# Patient Record
Sex: MALE | Race: WHITE | NOT HISPANIC OR LATINO | Employment: OTHER | URBAN - METROPOLITAN AREA
[De-identification: names, ages, dates, MRNs, and addresses within clinical notes are randomized per-mention and may not be internally consistent; named-entity substitution may affect disease eponyms.]

---

## 2020-03-26 ENCOUNTER — OFFICE VISIT (OUTPATIENT)
Dept: UROLOGY | Facility: CLINIC | Age: 75
End: 2020-03-26
Payer: MEDICARE

## 2020-03-26 VITALS
RESPIRATION RATE: 18 BRPM | DIASTOLIC BLOOD PRESSURE: 72 MMHG | WEIGHT: 218 LBS | TEMPERATURE: 98 F | BODY MASS INDEX: 27.98 KG/M2 | SYSTOLIC BLOOD PRESSURE: 125 MMHG | HEART RATE: 64 BPM | HEIGHT: 74 IN

## 2020-03-26 DIAGNOSIS — R33.9 URINARY RETENTION: ICD-10-CM

## 2020-03-26 DIAGNOSIS — N13.8 BPH WITH OBSTRUCTION/LOWER URINARY TRACT SYMPTOMS: Primary | ICD-10-CM

## 2020-03-26 DIAGNOSIS — R30.0 DYSURIA: ICD-10-CM

## 2020-03-26 DIAGNOSIS — N40.1 BPH WITH OBSTRUCTION/LOWER URINARY TRACT SYMPTOMS: Primary | ICD-10-CM

## 2020-03-26 PROCEDURE — 99204 OFFICE O/P NEW MOD 45 MIN: CPT | Mod: S$GLB,,, | Performed by: UROLOGY

## 2020-03-26 PROCEDURE — 99204 PR OFFICE/OUTPT VISIT, NEW, LEVL IV, 45-59 MIN: ICD-10-PCS | Mod: S$GLB,,, | Performed by: UROLOGY

## 2020-03-26 RX ORDER — MEMANTINE HYDROCHLORIDE 5 MG/1
TABLET ORAL
COMMUNITY
Start: 2020-02-12 | End: 2023-10-31

## 2020-03-26 RX ORDER — LEVOTHYROXINE SODIUM 150 MCG
TABLET ORAL
COMMUNITY
Start: 2020-02-19 | End: 2023-10-31

## 2020-03-26 RX ORDER — LOSARTAN POTASSIUM 100 MG/1
TABLET ORAL
COMMUNITY
Start: 2020-03-15

## 2020-03-26 RX ORDER — TAMSULOSIN HYDROCHLORIDE 0.4 MG/1
CAPSULE ORAL
COMMUNITY
Start: 2020-03-16 | End: 2020-10-27

## 2020-03-26 RX ORDER — ATORVASTATIN CALCIUM 40 MG/1
TABLET, FILM COATED ORAL
COMMUNITY
Start: 2020-01-12 | End: 2023-10-31

## 2020-03-26 RX ORDER — SITAGLIPTIN AND METFORMIN HYDROCHLORIDE 1000; 100 MG/1; MG/1
TABLET, FILM COATED, EXTENDED RELEASE ORAL
COMMUNITY
Start: 2020-03-16 | End: 2023-10-31

## 2020-03-26 RX ORDER — FINASTERIDE 5 MG/1
5 TABLET, FILM COATED ORAL DAILY
Qty: 30 TABLET | Refills: 11 | Status: SHIPPED | OUTPATIENT
Start: 2020-03-26 | End: 2020-10-27

## 2020-03-26 RX ORDER — AMLODIPINE BESYLATE 10 MG/1
TABLET ORAL
COMMUNITY
Start: 2020-01-12

## 2020-03-26 RX ORDER — NAPROXEN SODIUM 220 MG/1
81 TABLET, FILM COATED ORAL
COMMUNITY

## 2020-03-26 RX ORDER — HYDROCHLOROTHIAZIDE 12.5 MG/1
TABLET ORAL
COMMUNITY
Start: 2020-03-16 | End: 2022-11-15

## 2020-03-26 RX ORDER — METOPROLOL SUCCINATE 200 MG/1
TABLET, EXTENDED RELEASE ORAL
COMMUNITY
Start: 2020-02-08

## 2020-03-26 NOTE — PROGRESS NOTES
Subjective:       Patient ID: Kevan Rodriguez is a 74 y.o. male.    Chief Complaint: Urinary Retention (shoulder sx done last thursday, that friday cath placed. once removed cath placed again yesterday due to retention)      HPI:  74-year-old male who had postop urinary retention after shoulder surgery he has been on Flomax for several years he failed 1 voiding trial after surgery and had a 2nd catheter placed he is here for management    Past Medical History:   Past Medical History:   Diagnosis Date    Dementia     Diabetes mellitus     Hypertension        Past Surgical Historical:   Past Surgical History:   Procedure Laterality Date    SHOULDER SURGERY Left         Medications:   Medication List with Changes/Refills   Current Medications    AMLODIPINE (NORVASC) 10 MG TABLET        ASPIRIN 81 MG CHEW    81 mg.    ATORVASTATIN (LIPITOR) 40 MG TABLET        HYDROCHLOROTHIAZIDE (HYDRODIURIL) 12.5 MG TAB        JANUMET -1,000 MG TM24        LOSARTAN (COZAAR) 100 MG TABLET        MEMANTINE (NAMENDA) 5 MG TAB        METOPROLOL SUCCINATE (TOPROL-XL) 200 MG 24 HR TABLET        SYNTHROID 150 MCG TABLET        TAMSULOSIN (FLOMAX) 0.4 MG CAP            Past Social History:   Social History     Socioeconomic History    Marital status:      Spouse name: Not on file    Number of children: Not on file    Years of education: Not on file    Highest education level: Not on file   Occupational History    Not on file   Social Needs    Financial resource strain: Not on file    Food insecurity:     Worry: Not on file     Inability: Not on file    Transportation needs:     Medical: Not on file     Non-medical: Not on file   Tobacco Use    Smoking status: Never Smoker    Smokeless tobacco: Never Used   Substance and Sexual Activity    Alcohol use: Never     Frequency: Never    Drug use: Never    Sexual activity: Not on file   Lifestyle    Physical activity:     Days per week: Not on file     Minutes per  session: Not on file    Stress: Not on file   Relationships    Social connections:     Talks on phone: Not on file     Gets together: Not on file     Attends Hindu service: Not on file     Active member of club or organization: Not on file     Attends meetings of clubs or organizations: Not on file     Relationship status: Not on file   Other Topics Concern    Not on file   Social History Narrative    Not on file       Allergies: Review of patient's allergies indicates:  No Known Allergies     Family History: History reviewed. No pertinent family history.     Review of Systems:  Review of Systems   Constitutional: Negative for activity change and appetite change.   HENT: Negative for congestion and dental problem.    Eyes: Negative for visual disturbance.   Respiratory: Negative for chest tightness and shortness of breath.    Cardiovascular: Negative for chest pain.   Gastrointestinal: Negative for abdominal distention and abdominal pain.   Endocrine: Negative for polyuria.   Genitourinary: Negative for difficulty urinating, discharge, dysuria, flank pain, frequency, hematuria, penile pain, penile swelling, scrotal swelling, testicular pain and urgency.   Musculoskeletal: Negative for back pain and neck pain.   Skin: Negative for color change.   Allergic/Immunologic: Positive for immunocompromised state.   Neurological: Negative for dizziness.   Hematological: Negative for adenopathy.   Psychiatric/Behavioral: Negative for agitation, behavioral problems and confusion.       Physical Exam:  Physical Exam   Constitutional: He is oriented to person, place, and time. He appears well-developed and well-nourished. No distress.   HENT:   Head: Normocephalic and atraumatic.   Nose: Nose normal.   Mouth/Throat: Oropharynx is clear and moist.   Eyes: Conjunctivae and EOM are normal. Pupils are equal, round, and reactive to light. No scleral icterus.   Neck: Neck supple. No tracheal deviation present. No thyromegaly  present.   Cardiovascular: Normal rate, regular rhythm and normal heart sounds.    Pulmonary/Chest: Effort normal and breath sounds normal. No respiratory distress. He has no wheezes. He has no rales.   Abdominal: Soft. Bowel sounds are normal. He exhibits no distension. There is no tenderness. There is no rebound and no guarding.   Genitourinary: Prostate normal and penis normal. No penile tenderness.   Musculoskeletal: Normal range of motion. He exhibits no edema or deformity.   Lymphadenopathy:     He has no cervical adenopathy.   Neurological: He is alert and oriented to person, place, and time. No cranial nerve deficit.   Skin: Skin is warm and dry. No rash noted. No erythema.     Psychiatric: He has a normal mood and affect. His behavior is normal.       Assessment/Plan:       Problem List Items Addressed This Visit     None      Visit Diagnoses     BPH with obstruction/lower urinary tract symptoms    -  Primary    Urinary retention        Dysuria                 .  Postop urinary retention:  The patient is to take double dose of Flomax we also prescribed finasteride he is to come back on Monday for a voiding trial we discussed transurethral resection of prostate and will proceed as necessary

## 2020-03-30 ENCOUNTER — CLINICAL SUPPORT (OUTPATIENT)
Dept: UROLOGY | Facility: CLINIC | Age: 75
End: 2020-03-30
Payer: MEDICARE

## 2020-03-30 ENCOUNTER — TELEPHONE (OUTPATIENT)
Dept: UROLOGY | Facility: CLINIC | Age: 75
End: 2020-03-30

## 2020-03-30 DIAGNOSIS — R33.9 URINARY RETENTION: Primary | ICD-10-CM

## 2020-03-30 PROCEDURE — 51700 IRRIGATION OF BLADDER: CPT | Mod: S$GLB,,, | Performed by: UROLOGY

## 2020-03-30 PROCEDURE — 51700 PR IRRIGATION, BLADDER: ICD-10-PCS | Mod: S$GLB,,, | Performed by: UROLOGY

## 2020-03-30 NOTE — TELEPHONE ENCOUNTER
Spoke to pt's wife who will continue to monitor when pt voids and will call us in the morning. Advised her to take him to the ER after hours if pt is unable to void. She verbalized understanding. ----- Message from Chaz Duran sent at 3/30/2020  3:21 PM CDT -----  Contact: Pt's wife  Kevan's wife called to say that the patient has only voided 300cc's and she has given him 2000cc's of fluid today [phone].

## 2020-03-30 NOTE — PROGRESS NOTES
Pt here for voiding trail status hospital f/u for shoulder surgery. Inserted roughly 200cc. Catheter balloon deflated, roughly 15cc. Removed catheter. Pt tolerated well. Pt unable to void during office visit, went home with urinal and with instructions to drink plenty fluids and call back if unable to void before 3pm. Pt verbalized understanding. MG

## 2020-04-06 ENCOUNTER — TELEPHONE (OUTPATIENT)
Dept: UROLOGY | Facility: CLINIC | Age: 75
End: 2020-04-06

## 2020-04-06 NOTE — TELEPHONE ENCOUNTER
----- Message from Bindu Brandon sent at 4/6/2020  9:45 AM CDT -----  Contact: patient's wife  Patient's wife is needing to schedule patient an appointment . Please call back at [phone]

## 2020-04-06 NOTE — TELEPHONE ENCOUNTER
pts wife stated last Monday after cath was removed here in office they had to go to ER again to have it replaced due to retention. She stated they would like to keep this catheter until they know what is wrong bc as we remove they keep having to go back. Informed pt we would scheduled a visit to talk with Dr. Pimentel regarding a plan, pt requested this. Scheduled for Thursday. [facility]

## 2020-04-09 ENCOUNTER — OFFICE VISIT (OUTPATIENT)
Dept: UROLOGY | Facility: CLINIC | Age: 75
End: 2020-04-09
Payer: MEDICARE

## 2020-04-09 VITALS
DIASTOLIC BLOOD PRESSURE: 56 MMHG | WEIGHT: 218 LBS | BODY MASS INDEX: 27.98 KG/M2 | SYSTOLIC BLOOD PRESSURE: 100 MMHG | RESPIRATION RATE: 18 BRPM | HEIGHT: 74 IN | HEART RATE: 62 BPM

## 2020-04-09 DIAGNOSIS — N13.8 BPH WITH OBSTRUCTION/LOWER URINARY TRACT SYMPTOMS: ICD-10-CM

## 2020-04-09 DIAGNOSIS — R35.1 NOCTURIA: ICD-10-CM

## 2020-04-09 DIAGNOSIS — N40.1 BPH WITH OBSTRUCTION/LOWER URINARY TRACT SYMPTOMS: ICD-10-CM

## 2020-04-09 DIAGNOSIS — R33.9 URINARY RETENTION: Primary | ICD-10-CM

## 2020-04-09 PROCEDURE — 99213 PR OFFICE/OUTPT VISIT, EST, LEVL III, 20-29 MIN: ICD-10-PCS | Mod: S$GLB,,, | Performed by: UROLOGY

## 2020-04-09 PROCEDURE — 99213 OFFICE O/P EST LOW 20 MIN: CPT | Mod: S$GLB,,, | Performed by: UROLOGY

## 2020-04-09 RX ORDER — NITROFURANTOIN 25; 75 MG/1; MG/1
CAPSULE ORAL
COMMUNITY
Start: 2020-03-31 | End: 2022-11-15

## 2020-04-09 NOTE — PROGRESS NOTES
Subjective:       Patient ID: Kevan Rodriguez is a 74 y.o. male.    Chief Complaint: Urinary Retention (after voiding trial/removal of catheter pt is still having retention ending up in ER with another catheter placed. here today to discuss options on what to do/plan)      HPI:  74-year-old male recent left-sided shoulder surgery went into postop retention subsequently he has failed now 2 voiding trials.  He is on Flomax and finasteride for 2 weeks    Past Medical History:   Past Medical History:   Diagnosis Date    Dementia     Diabetes mellitus     Hypertension        Past Surgical Historical:   Past Surgical History:   Procedure Laterality Date    SHOULDER SURGERY Left         Medications:   Medication List with Changes/Refills   Current Medications    AMLODIPINE (NORVASC) 10 MG TABLET        ASPIRIN 81 MG CHEW    81 mg.    ATORVASTATIN (LIPITOR) 40 MG TABLET        FINASTERIDE (PROSCAR) 5 MG TABLET    Take 1 tablet (5 mg total) by mouth once daily.    HYDROCHLOROTHIAZIDE (HYDRODIURIL) 12.5 MG TAB        JANUMET -1,000 MG TM24        LOSARTAN (COZAAR) 100 MG TABLET        MEMANTINE (NAMENDA) 5 MG TAB        METOPROLOL SUCCINATE (TOPROL-XL) 200 MG 24 HR TABLET        NITROFURANTOIN, MACROCRYSTAL-MONOHYDRATE, (MACROBID) 100 MG CAPSULE        SYNTHROID 150 MCG TABLET        TAMSULOSIN (FLOMAX) 0.4 MG CAP            Past Social History:   Social History     Socioeconomic History    Marital status:      Spouse name: Not on file    Number of children: Not on file    Years of education: Not on file    Highest education level: Not on file   Occupational History    Not on file   Social Needs    Financial resource strain: Not on file    Food insecurity:     Worry: Not on file     Inability: Not on file    Transportation needs:     Medical: Not on file     Non-medical: Not on file   Tobacco Use    Smoking status: Never Smoker    Smokeless tobacco: Never Used   Substance and Sexual Activity     Alcohol use: Never     Frequency: Never    Drug use: Never    Sexual activity: Not on file   Lifestyle    Physical activity:     Days per week: Not on file     Minutes per session: Not on file    Stress: Not on file   Relationships    Social connections:     Talks on phone: Not on file     Gets together: Not on file     Attends Gnosticist service: Not on file     Active member of club or organization: Not on file     Attends meetings of clubs or organizations: Not on file     Relationship status: Not on file   Other Topics Concern    Not on file   Social History Narrative    Not on file       Allergies: Review of patient's allergies indicates:  No Known Allergies     Family History: History reviewed. No pertinent family history.     Review of Systems:  Review of Systems   Constitutional: Negative for activity change and appetite change.   HENT: Negative for congestion and dental problem.    Respiratory: Negative for chest tightness and shortness of breath.    Cardiovascular: Negative for chest pain.   Gastrointestinal: Negative for abdominal distention and abdominal pain.   Endocrine: Negative for polyuria.   Genitourinary: Negative for difficulty urinating, discharge, dysuria, flank pain, frequency, hematuria, penile pain, penile swelling, scrotal swelling, testicular pain and urgency.   Musculoskeletal: Negative for back pain and neck pain.   Allergic/Immunologic: Positive for food allergies.   Neurological: Negative for dizziness.   Hematological: Negative for adenopathy.   Psychiatric/Behavioral: Negative for agitation, behavioral problems and confusion.       Physical Exam:  Physical Exam   Constitutional: He appears well-developed and well-nourished.   HENT:   Head: Normocephalic and atraumatic.   Eyes: Pupils are equal, round, and reactive to light.   Cardiovascular: Normal rate, regular rhythm and normal heart sounds.    Pulmonary/Chest: Effort normal and breath sounds normal. No respiratory distress.  He has no wheezes. He has no rales.   Abdominal: Soft. Bowel sounds are normal. He exhibits no distension. There is no tenderness. There is no rebound and no guarding.   Genitourinary: Prostate normal and penis normal. No penile tenderness.   Musculoskeletal: Normal range of motion.       Assessment/Plan:       Problem List Items Addressed This Visit     None      Visit Diagnoses     Urinary retention    -  Primary    BPH with obstruction/lower urinary tract symptoms        Nocturia                 Patient does not want to fail another voiding trial I told him it would be best to wait 1 more week that would give him nearly a month on finasteride and a month from surgery he understands that if he fails this voiding trial he likely needs a transurethral resection of the prostate

## 2020-04-16 ENCOUNTER — CLINICAL SUPPORT (OUTPATIENT)
Dept: UROLOGY | Facility: CLINIC | Age: 75
End: 2020-04-16
Payer: MEDICARE

## 2020-04-16 DIAGNOSIS — R33.9 URINARY RETENTION: Primary | ICD-10-CM

## 2020-04-16 NOTE — PROGRESS NOTES
Patient came into clinic for f/u voiding trial. Removed catheter bag and tube. Using sterile technique, inserted sterile water into bladder via catheter until patient stated his bladder felt full, which was at a total of 270cc. I then removed 10cc of water from catheter balloon and removed the barker. Within 5 minutes of cath removal, patient voided 295cc of urine. Advised him to continue to drink plenty of water to promote voiding at home. Advised pt to call us if any issues arise with voiding before his 6 mo f/u appt on Oct 19, 2020. Advised pt to go to ER after hours if retaining excessive urine, having bladder pain, high fever any other serious symptoms. Patient verbalized understanding.

## 2020-10-27 ENCOUNTER — OFFICE VISIT (OUTPATIENT)
Dept: UROLOGY | Facility: CLINIC | Age: 75
End: 2020-10-27
Payer: MEDICARE

## 2020-10-27 VITALS
DIASTOLIC BLOOD PRESSURE: 61 MMHG | BODY MASS INDEX: 27.22 KG/M2 | HEART RATE: 53 BPM | WEIGHT: 212 LBS | SYSTOLIC BLOOD PRESSURE: 109 MMHG

## 2020-10-27 DIAGNOSIS — R33.9 URINARY RETENTION: Primary | ICD-10-CM

## 2020-10-27 DIAGNOSIS — N40.1 BPH WITH OBSTRUCTION/LOWER URINARY TRACT SYMPTOMS: ICD-10-CM

## 2020-10-27 DIAGNOSIS — N13.8 BPH WITH OBSTRUCTION/LOWER URINARY TRACT SYMPTOMS: ICD-10-CM

## 2020-10-27 PROCEDURE — 99213 OFFICE O/P EST LOW 20 MIN: CPT | Mod: S$GLB,,, | Performed by: UROLOGY

## 2020-10-27 PROCEDURE — 99213 PR OFFICE/OUTPT VISIT, EST, LEVL III, 20-29 MIN: ICD-10-PCS | Mod: S$GLB,,, | Performed by: UROLOGY

## 2020-10-27 RX ORDER — MEMANTINE HYDROCHLORIDE 10 MG/1
TABLET ORAL
COMMUNITY
Start: 2020-10-05

## 2020-10-27 RX ORDER — BLOOD SUGAR DIAGNOSTIC
STRIP MISCELLANEOUS
COMMUNITY
Start: 2020-10-06

## 2020-10-27 RX ORDER — BLOOD-GLUCOSE METER
EACH MISCELLANEOUS
COMMUNITY
Start: 2020-09-01

## 2020-10-27 RX ORDER — FINASTERIDE 5 MG/1
5 TABLET, FILM COATED ORAL DAILY
Qty: 30 TABLET | Refills: 11 | Status: SHIPPED | OUTPATIENT
Start: 2020-10-27 | End: 2021-07-15 | Stop reason: SDUPTHER

## 2020-10-27 RX ORDER — TAMSULOSIN HYDROCHLORIDE 0.4 MG/1
0.4 CAPSULE ORAL DAILY
Qty: 30 CAPSULE | Refills: 11 | Status: SHIPPED | OUTPATIENT
Start: 2020-10-27 | End: 2022-01-19 | Stop reason: SDUPTHER

## 2020-10-27 RX ORDER — SUVOREXANT 10 MG/1
1 TABLET, FILM COATED ORAL NIGHTLY
COMMUNITY
Start: 2020-09-02 | End: 2022-11-15

## 2020-10-27 NOTE — PROGRESS NOTES
Subjective:       Patient ID: Kevan Rodriguez is a 75 y.o. male.    Chief Complaint: Follow-up (6 mos f/u)      HPI:  75-year-old male with a history of urinary retention he has been placed on Flomax and finasteride long-term he is here for routine follow-up to confirm that he is not in urinary retention.  He has no new complaints.    Past Medical History:   Past Medical History:   Diagnosis Date    Dementia     Diabetes mellitus     Hypertension        Past Surgical Historical:   Past Surgical History:   Procedure Laterality Date    SHOULDER SURGERY Left         Medications:   Medication List with Changes/Refills   Current Medications    AMLODIPINE (NORVASC) 10 MG TABLET        ASPIRIN 81 MG CHEW    81 mg.    ATORVASTATIN (LIPITOR) 40 MG TABLET        BELSOMRA 10 MG TAB    Take 1 tablet by mouth nightly.    CONTOUR NEXT METER MISC    TEST BLOOD SUGAR DAILY    CONTOUR NEXT TEST STRIPS STRP        HYDROCHLOROTHIAZIDE (HYDRODIURIL) 12.5 MG TAB        JANUMET -1,000 MG TM24        LOSARTAN (COZAAR) 100 MG TABLET        MEMANTINE (NAMENDA) 10 MG TAB        MEMANTINE (NAMENDA) 5 MG TAB        METOPROLOL SUCCINATE (TOPROL-XL) 200 MG 24 HR TABLET        NITROFURANTOIN, MACROCRYSTAL-MONOHYDRATE, (MACROBID) 100 MG CAPSULE        SYNTHROID 150 MCG TABLET       Discontinued Medications    FINASTERIDE (PROSCAR) 5 MG TABLET    Take 1 tablet (5 mg total) by mouth once daily.    TAMSULOSIN (FLOMAX) 0.4 MG CAP            Past Social History:   Social History     Socioeconomic History    Marital status:      Spouse name: Not on file    Number of children: Not on file    Years of education: Not on file    Highest education level: Not on file   Occupational History    Not on file   Social Needs    Financial resource strain: Not on file    Food insecurity     Worry: Not on file     Inability: Not on file    Transportation needs     Medical: Not on file     Non-medical: Not on file   Tobacco Use    Smoking  status: Never Smoker    Smokeless tobacco: Never Used   Substance and Sexual Activity    Alcohol use: Never     Frequency: Never    Drug use: Never    Sexual activity: Not on file   Lifestyle    Physical activity     Days per week: Not on file     Minutes per session: Not on file    Stress: Not on file   Relationships    Social connections     Talks on phone: Not on file     Gets together: Not on file     Attends Jewish service: Not on file     Active member of club or organization: Not on file     Attends meetings of clubs or organizations: Not on file     Relationship status: Not on file   Other Topics Concern    Not on file   Social History Narrative    Not on file       Allergies: Review of patient's allergies indicates:  No Known Allergies     Family History: History reviewed. No pertinent family history.     Review of Systems:  Review of Systems   Constitutional: Negative for activity change and appetite change.   HENT: Negative for congestion and dental problem.    Respiratory: Negative for chest tightness and shortness of breath.    Cardiovascular: Negative for chest pain.   Gastrointestinal: Negative for abdominal distention and abdominal pain.   Endocrine: Negative for polyuria.   Genitourinary: Negative for difficulty urinating, discharge, dysuria, flank pain, frequency, hematuria, penile pain, penile swelling, scrotal swelling, testicular pain and urgency.   Musculoskeletal: Negative for back pain and neck pain.   Allergic/Immunologic: Positive for food allergies.   Neurological: Negative for dizziness.   Hematological: Negative for adenopathy.   Psychiatric/Behavioral: Negative for agitation, behavioral problems and confusion.       Physical Exam:  Physical Exam  Constitutional:       Appearance: He is well-developed.   HENT:      Head: Normocephalic and atraumatic.   Eyes:      Pupils: Pupils are equal, round, and reactive to light.   Cardiovascular:      Rate and Rhythm: Normal rate and  regular rhythm.      Heart sounds: Normal heart sounds.   Pulmonary:      Effort: Pulmonary effort is normal. No respiratory distress.      Breath sounds: Normal breath sounds. No wheezing or rales.   Abdominal:      General: Bowel sounds are normal. There is no distension.      Palpations: Abdomen is soft.      Tenderness: There is no abdominal tenderness. There is no guarding or rebound.   Genitourinary:     Penis: Normal. No tenderness.       Prostate: Normal.   Musculoskeletal: Normal range of motion.         Assessment/Plan:       Problem List Items Addressed This Visit     None      Visit Diagnoses     Urinary retention    -  Primary    Relevant Orders    POCT Bladder Scan    BPH with obstruction/lower urinary tract symptoms                 Urinary retention:  Postvoid residual urine volume was measured today and found to be 52 cc.  Certainly this is acceptable for 75-year-old male he is to continue taking his Flomax and finasteride and return to clinic in 1 year

## 2021-07-15 ENCOUNTER — TELEPHONE (OUTPATIENT)
Dept: UROLOGY | Facility: CLINIC | Age: 76
End: 2021-07-15

## 2021-07-15 DIAGNOSIS — R33.9 URINARY RETENTION: ICD-10-CM

## 2021-07-15 RX ORDER — FINASTERIDE 5 MG/1
5 TABLET, FILM COATED ORAL DAILY
Qty: 30 TABLET | Refills: 11 | Status: SHIPPED | OUTPATIENT
Start: 2021-07-15 | End: 2021-07-15

## 2021-07-15 RX ORDER — FINASTERIDE 5 MG/1
5 TABLET, FILM COATED ORAL DAILY
Qty: 90 TABLET | Refills: 3 | Status: SHIPPED | OUTPATIENT
Start: 2021-07-15 | End: 2022-01-19 | Stop reason: SDUPTHER

## 2021-10-26 ENCOUNTER — OFFICE VISIT (OUTPATIENT)
Dept: UROLOGY | Facility: CLINIC | Age: 76
End: 2021-10-26
Payer: MEDICARE

## 2021-10-26 VITALS
WEIGHT: 218 LBS | BODY MASS INDEX: 27.98 KG/M2 | HEIGHT: 74 IN | SYSTOLIC BLOOD PRESSURE: 121 MMHG | HEART RATE: 58 BPM | DIASTOLIC BLOOD PRESSURE: 53 MMHG

## 2021-10-26 DIAGNOSIS — N13.8 BPH WITH OBSTRUCTION/LOWER URINARY TRACT SYMPTOMS: ICD-10-CM

## 2021-10-26 DIAGNOSIS — N40.1 BPH WITH OBSTRUCTION/LOWER URINARY TRACT SYMPTOMS: ICD-10-CM

## 2021-10-26 DIAGNOSIS — R33.9 URINARY RETENTION: Primary | ICD-10-CM

## 2021-10-26 PROCEDURE — 99213 OFFICE O/P EST LOW 20 MIN: CPT | Mod: S$GLB,,, | Performed by: UROLOGY

## 2021-10-26 PROCEDURE — 99213 PR OFFICE/OUTPT VISIT, EST, LEVL III, 20-29 MIN: ICD-10-PCS | Mod: S$GLB,,, | Performed by: UROLOGY

## 2021-10-26 RX ORDER — DOXEPIN HYDROCHLORIDE 10 MG/1
CAPSULE ORAL
COMMUNITY
Start: 2021-08-21 | End: 2022-11-15

## 2022-01-19 ENCOUNTER — OFFICE VISIT (OUTPATIENT)
Dept: UROLOGY | Facility: CLINIC | Age: 77
End: 2022-01-19
Payer: MEDICARE

## 2022-01-19 DIAGNOSIS — R33.9 URINARY RETENTION: Primary | ICD-10-CM

## 2022-01-19 DIAGNOSIS — N13.8 BPH WITH OBSTRUCTION/LOWER URINARY TRACT SYMPTOMS: ICD-10-CM

## 2022-01-19 DIAGNOSIS — N40.1 BPH WITH OBSTRUCTION/LOWER URINARY TRACT SYMPTOMS: ICD-10-CM

## 2022-01-19 PROBLEM — M47.812 CERVICAL SPONDYLOSIS WITHOUT MYELOPATHY: Status: ACTIVE | Noted: 2022-01-19

## 2022-01-19 PROBLEM — M54.2 NECK PAIN: Status: ACTIVE | Noted: 2022-01-19

## 2022-01-19 PROBLEM — R33.8 ACUTE RETENTION OF URINE: Status: ACTIVE | Noted: 2022-01-19

## 2022-01-19 PROBLEM — E78.5 HYPERLIPIDEMIA: Status: ACTIVE | Noted: 2022-01-19

## 2022-01-19 PROBLEM — M54.17 LUMBOSACRAL RADICULOPATHY: Status: ACTIVE | Noted: 2022-01-19

## 2022-01-19 PROBLEM — M54.50 LOW BACK PAIN: Status: ACTIVE | Noted: 2022-01-19

## 2022-01-19 PROBLEM — I10 ESSENTIAL HYPERTENSION: Status: ACTIVE | Noted: 2022-01-19

## 2022-01-19 PROBLEM — F03.90 DEMENTIA: Status: ACTIVE | Noted: 2022-01-19

## 2022-01-19 PROBLEM — N99.89 POSTOPERATIVE RETENTION OF URINE: Status: ACTIVE | Noted: 2022-01-19

## 2022-01-19 PROBLEM — E03.9 HYPOTHYROIDISM: Status: ACTIVE | Noted: 2022-01-19

## 2022-01-19 PROBLEM — G91.2 NORMAL PRESSURE HYDROCEPHALUS: Status: ACTIVE | Noted: 2022-01-19

## 2022-01-19 PROBLEM — R33.8 POSTOPERATIVE RETENTION OF URINE: Status: ACTIVE | Noted: 2022-01-19

## 2022-01-19 PROBLEM — E11.9 TYPE 2 DIABETES MELLITUS WITHOUT COMPLICATION: Status: ACTIVE | Noted: 2022-01-19

## 2022-01-19 PROCEDURE — 51798 US URINE CAPACITY MEASURE: CPT | Mod: S$GLB,,, | Performed by: NURSE PRACTITIONER

## 2022-01-19 PROCEDURE — 51700 PR IRRIGATION, BLADDER: ICD-10-PCS | Mod: S$GLB,,, | Performed by: NURSE PRACTITIONER

## 2022-01-19 PROCEDURE — 99214 OFFICE O/P EST MOD 30 MIN: CPT | Mod: 25,S$GLB,, | Performed by: NURSE PRACTITIONER

## 2022-01-19 PROCEDURE — 99214 PR OFFICE/OUTPT VISIT, EST, LEVL IV, 30-39 MIN: ICD-10-PCS | Mod: 25,S$GLB,, | Performed by: NURSE PRACTITIONER

## 2022-01-19 PROCEDURE — 51798 PR MEAS,POST-VOID RES,US,NON-IMAGING: ICD-10-PCS | Mod: S$GLB,,, | Performed by: NURSE PRACTITIONER

## 2022-01-19 PROCEDURE — 51700 IRRIGATION OF BLADDER: CPT | Mod: S$GLB,,, | Performed by: NURSE PRACTITIONER

## 2022-01-19 RX ORDER — TAMSULOSIN HYDROCHLORIDE 0.4 MG/1
0.4 CAPSULE ORAL DAILY
Qty: 30 CAPSULE | Refills: 11 | Status: SHIPPED | OUTPATIENT
Start: 2022-01-19 | End: 2022-02-14

## 2022-01-19 RX ORDER — FINASTERIDE 5 MG/1
5 TABLET, FILM COATED ORAL DAILY
Qty: 90 TABLET | Refills: 3 | Status: SHIPPED | OUTPATIENT
Start: 2022-01-19 | End: 2022-02-14

## 2022-01-19 NOTE — PROGRESS NOTES
14 [place]onian couse indwelling barker inserted with slight resistance, urine return noted. Flushed with 60cc sterile H2O. Ballon inflated with 10cc sterile H2). Leg bag attaches. Tolerated well.

## 2022-01-19 NOTE — PROGRESS NOTES
Chief Complaint:   Chief Complaint   Patient presents with    Urinary Retention     Hosp f/u        HPI:  76-year-old male who presents for hospital follow-up.  He was recently hospitalized, experienced urinary retention and was discharged home with catheter in place.  He presents today for voiding trial with possible catheter removal.  He has a longstanding history of BPH with lower urinary tract symptoms, maintained on tamsulosin and finasteride.  He had a prior episode of acute urinary retention in March 2020 requiring Montes catheterization. He denies any malodorous urine, hematuria, abdominal pain, flank pain, fever, or chills.       Allergies:  Patient has no known allergies.    Medications:  has a current medication list which includes the following prescription(s): amlodipine, aspirin, atorvastatin, belsomra, contour next meter, contour next test strips, doxepin, finasteride, hydrochlorothiazide, janumet xr, losartan, memantine, memantine, metoprolol succinate, nitrofurantoin (macrocrystal-monohydrate), synthroid, and tamsulosin.    Review of Systems:  General: No fever, chills, vision changes, dizziness, weakness, fatigue, unexplained weight loss, confusion, or mood swings.  Skin: No rashes, itching, or changes in color/texture of skin.  Chest: Denies VELAZCO, cyanosis, wheezing, cough, and sputum production.  Abdomen: Appetite fine. Denies diarrhea, abdominal pain, hematemesis, or blood in stool.  Musculoskeletal: No joint stiffness or swelling. No painful lymph nodes.  : reviewed and negative except as stated above in the HPI.  All other review of systems negative.    PMH:   has a past medical history of Dementia, Diabetes mellitus, Hypertension, and Urinary retention.    PSH:   has a past surgical history that includes Shoulder surgery (Left).    FamHx: family history is not on file.    SocHx:  reports that he has never smoked. He has never used smokeless tobacco. He reports that he does not drink alcohol  and does not use drugs.      Physical Exam:  There were no vitals filed for this visit.  General: AAOx3, no apparent distress, no deformities  Neck: supple, no masses, normal thyroid, full ROM  Lungs: CTAB, no adventitious breath sounds, normal inspiration, no use of accessory muscles  Heart: regular rate and rhythm, no arrhythmias  Abdomen: soft, NT, ND, no masses, no hernias, no hepatosplenomegaly  Lymphatic: no unusually enlarged or tender lymph nodes  Skin: warm and dry, no jaundice, no rash  Ext: without edema or deformity, CELAYA, ambulates independently  : Barker catheter in place upon admission, removed and replaced    Labs/Studies: bladder scan 0mL    Impression/Plan:   Urinary retention  Comments:  presented to us with Barker catheter placed during hospitalization, failed VT, complicated replacement of catheter, plan for further evaluation with cystoscopy  Orders:  -     Cystoscopy; Future; Expected date: 01/19/2022  -     POCT Bladder Scan  -     Insert barker catheter  -     tamsulosin (FLOMAX) 0.4 mg Cap; Take 1 capsule (0.4 mg total) by mouth once daily.  Dispense: 30 capsule; Refill: 11  -     finasteride (PROSCAR) 5 mg tablet; Take 1 tablet (5 mg total) by mouth once daily.  Dispense: 90 tablet; Refill: 3    BPH with obstruction/lower urinary tract symptoms  Comments:  longstanding history, maintained on tamsulosin and finasteride, will plan for cystoscopy for further evaluation as he likely needs TURP  Orders:  -     Cystoscopy; Future; Expected date: 01/19/2022        Follow up for cystoscopy with voiding trial, Barker catheter in place.

## 2022-01-19 NOTE — PROGRESS NOTES
Patient presented to clinic for voiding trial and catheter removal. 50ml clear yellow urine emptied via catheter/cath bag. 250cc sterile water inserted into bladder via catheter, sterile technique maintained. 10cc removed from catheter balloon, 18F barker catheter removed and 100ml of clear yellow urine voided into urinal. Advised pt to continue to hydrate adequately with clear liquids and call clinic before 2pm should any discomfort or difficulty urinating occur. Pt verbalized understanding. lpn

## 2022-01-23 ENCOUNTER — NURSE TRIAGE (OUTPATIENT)
Dept: ADMINISTRATIVE | Facility: CLINIC | Age: 77
End: 2022-01-23
Payer: MEDICARE

## 2022-01-23 NOTE — TELEPHONE ENCOUNTER
Went in for shunt from the brain, had a catheter in bc he couldn't urinate after that procedure. Last night his wife isn't sure how the catheter came out, but he is bleeding from the urethra since 12 midnight. He Is not complaining of pain. Advise Jennifer Del Valle to bring him to the ED for an evaluation.     Reason for Disposition   [1] Catheter was accidentally pulled-out AND [2] bright red continuous bleeding    Additional Information   Negative: Shock suspected (e.g., cold/pale/clammy skin, too weak to stand, low BP, rapid pulse)   Negative: Sounds like a life-threatening emergency to the triager    Protocols used: ST URINARY CATHETER - AVILA - CELESTE-A-

## 2022-01-24 NOTE — TELEPHONE ENCOUNTER
Please call and check on patient as he was last seen for voiding trial and we experienced difficulty with catheter placement

## 2022-01-24 NOTE — TELEPHONE ENCOUNTER
Contacted, spoke with spouse she states that pt has been accidentally pulling catheter out. They went to ER to have it replaced, once they got home he accidentally pulled it again but only from insertion. She states that pt is talking in his sleep, and acting confused she states she will have Home Health do a urine sample she thinks he may be having UTI symptoms. We have openings for tomorrow asked if they would like to come in tomorrow for sooner appt. Spouse agrees appt. rescheduled. BJP

## 2022-01-25 ENCOUNTER — PROCEDURE VISIT (OUTPATIENT)
Dept: UROLOGY | Facility: CLINIC | Age: 77
End: 2022-01-25
Payer: MEDICARE

## 2022-01-25 VITALS
SYSTOLIC BLOOD PRESSURE: 193 MMHG | WEIGHT: 212 LBS | BODY MASS INDEX: 27.22 KG/M2 | DIASTOLIC BLOOD PRESSURE: 81 MMHG | OXYGEN SATURATION: 98 % | RESPIRATION RATE: 15 BRPM

## 2022-01-25 DIAGNOSIS — T83.511A URINARY TRACT INFECTION ASSOCIATED WITH INDWELLING URETHRAL CATHETER, INITIAL ENCOUNTER: Primary | ICD-10-CM

## 2022-01-25 DIAGNOSIS — R33.9 URINARY RETENTION: ICD-10-CM

## 2022-01-25 DIAGNOSIS — N13.8 BPH WITH OBSTRUCTION/LOWER URINARY TRACT SYMPTOMS: ICD-10-CM

## 2022-01-25 DIAGNOSIS — N40.1 BPH WITH OBSTRUCTION/LOWER URINARY TRACT SYMPTOMS: ICD-10-CM

## 2022-01-25 DIAGNOSIS — N39.0 URINARY TRACT INFECTION ASSOCIATED WITH INDWELLING URETHRAL CATHETER, INITIAL ENCOUNTER: Primary | ICD-10-CM

## 2022-01-25 PROCEDURE — 52000 CYSTOSCOPY: ICD-10-PCS | Mod: S$GLB,,, | Performed by: UROLOGY

## 2022-01-25 PROCEDURE — 52000 CYSTOURETHROSCOPY: CPT | Mod: S$GLB,,, | Performed by: UROLOGY

## 2022-01-25 RX ORDER — CEFDINIR 300 MG/1
300 CAPSULE ORAL 2 TIMES DAILY
Qty: 20 CAPSULE | Refills: 0 | Status: SHIPPED | OUTPATIENT
Start: 2022-01-25 | End: 2022-02-04

## 2022-01-25 NOTE — PROCEDURES
"Cystoscopy    Date/Time: 1/25/2022 11:00 AM  Performed by: Topher Pimentel MD  Authorized by: Jil Martinez NP     Consent Done?:  Yes (Written)  Time out: Immediately prior to procedure a "time out" was called to verify the correct patient, procedure, equipment, support staff and site/side marked as required.    Indications: hematuria    Position:  Supine  Anesthesia:  Intraurethral instillation  Patient sedated?: No    Preparation: Patient was prepped and draped in usual sterile fashion      Scope type:  Flexible cystoscope  External exam normal: Yes    Urethra normal: Yes    Prostate normal: Yes       Patient was brought to the procedure room placed on the table padded prepped and draped in usual sterile fashion in supine position. The cystoscope was inserted into the urethra and advanced the urethra was normal prostate showed expected enlargement for patient's age, the bladder is entered and inspected is found to be free of tumor stone or foreign body.  Bilateral ureteral orifices were identified and noted to be normal in appearance with clear efflux of urine at this point the scope was removed the patient tolerated the procedure well there were no complications    After the procedure the patient was able urinate we will leave the Montes catheter out for now.  I will also treat him for acute cystitis with Omnicef      "

## 2022-01-26 ENCOUNTER — CLINICAL SUPPORT (OUTPATIENT)
Dept: UROLOGY | Facility: CLINIC | Age: 77
End: 2022-01-26
Payer: MEDICARE

## 2022-01-26 ENCOUNTER — TELEPHONE (OUTPATIENT)
Dept: UROLOGY | Facility: CLINIC | Age: 77
End: 2022-01-26
Payer: MEDICARE

## 2022-01-26 DIAGNOSIS — R33.9 URINARY RETENTION: Primary | ICD-10-CM

## 2022-01-26 NOTE — TELEPHONE ENCOUNTER
Pt is coming in today for bladder scan and poss cath replacement. [facility]

## 2022-01-26 NOTE — PROGRESS NOTES
Patient reported to clinic today with his daughter, she stated he has been urinating but that he is complaining of abdominal pain. His catheter was removed yesterday. Bladder scan was performed which resulted in 0cc. Pt educated on s/s of retention and on when to return to office or report to ER. They verbalized understanding. [facility]

## 2022-01-26 NOTE — TELEPHONE ENCOUNTER
----- Message from Debra Walter sent at 1/26/2022  1:25 PM CST -----  Regarding: pt advice  Contact: Pat/wife  Pat /wife states that pt is voiding now but he is having lower abd pain. Wants to know if pt needs to be scanned to see if he is emptying his bladder. Please call back at [phone]//thank you acc

## 2022-02-24 ENCOUNTER — TELEPHONE (OUTPATIENT)
Dept: UROLOGY | Facility: CLINIC | Age: 77
End: 2022-02-24
Payer: MEDICARE

## 2022-02-24 NOTE — TELEPHONE ENCOUNTER
----- Message from Yasmine Guzman sent at 2/24/2022  9:19 AM CST -----  Contact: PT daughter  .Type:  Needs Medical Advice    Who Called:  Cathy   Symptoms (please be specific):   overactive bladder at night/ gets lost in house trying to make it to bathroom and back with dementia   How long has patient had these symptoms:     Pharmacy name and phone #:   .    [id number] - [place], [place] - [address] [facility] [address] [facility] [address]  [address] [facility]  [place] [address]  Phone: [phone] Fax: [phone]       Would the patient rather a call back or a response via [facility]?  Callback   Best Call Back Number:  [phone]    Additional Information:  requesting RX

## 2022-02-24 NOTE — TELEPHONE ENCOUNTER
Dr granados stated that [facility] He stated we could try myrbetriq 25mg but can not do much more due to putting back into retention. Pt will come  a few samples of myrbetriq 25. [facility]

## 2022-03-02 ENCOUNTER — TELEPHONE (OUTPATIENT)
Dept: UROLOGY | Facility: CLINIC | Age: 77
End: 2022-03-02
Payer: MEDICARE

## 2022-03-02 DIAGNOSIS — R33.9 URINARY RETENTION: Primary | ICD-10-CM

## 2022-03-02 NOTE — TELEPHONE ENCOUNTER
Pt states the myrbetriq 25 mg is helping. I have sent script in for Dr. Pimentel to sign. I informed pt Dr. Pimentel is out and will be back tomorrow and will sign script then. Pt verbalized understanding.    ----- Message from Manjinder Lawton sent at 3/2/2022 10:01 AM CST -----  Contact: self  Patient wife called and asked if Dr Pimenetl can call a script of for Myrvetriq 25mg. Patient seem to be doing good on it.

## 2022-03-03 RX ORDER — MIRABEGRON 25 MG/1
25 TABLET, FILM COATED, EXTENDED RELEASE ORAL DAILY
Qty: 90 TABLET | Refills: 4 | Status: SHIPPED | OUTPATIENT
Start: 2022-03-03 | End: 2022-11-15

## 2022-03-22 ENCOUNTER — TELEPHONE (OUTPATIENT)
Dept: UROLOGY | Facility: CLINIC | Age: 77
End: 2022-03-22
Payer: MEDICARE

## 2022-03-22 NOTE — TELEPHONE ENCOUNTER
Pt called regarding billing/insurance issues. I have given pt [facility] Billing number.    ----- Message from Odessa Christie sent at 3/22/2022  1:52 PM CDT -----  Contact: Patient  Patient need the nurse to call her      Call back #  [phone]

## 2022-04-04 ENCOUNTER — CLINICAL SUPPORT (OUTPATIENT)
Dept: UROLOGY | Facility: CLINIC | Age: 77
End: 2022-04-04
Payer: MEDICARE

## 2022-08-19 ENCOUNTER — TELEPHONE (OUTPATIENT)
Dept: UROLOGY | Facility: CLINIC | Age: 77
End: 2022-08-19
Payer: MEDICARE

## 2022-10-27 ENCOUNTER — OFFICE VISIT (OUTPATIENT)
Dept: UROLOGY | Facility: CLINIC | Age: 77
End: 2022-10-27
Payer: MEDICARE

## 2022-10-27 VITALS — HEIGHT: 74 IN | RESPIRATION RATE: 18 BRPM | BODY MASS INDEX: 27.21 KG/M2 | WEIGHT: 212 LBS

## 2022-10-27 DIAGNOSIS — R33.9 URINARY RETENTION: Primary | ICD-10-CM

## 2022-10-27 DIAGNOSIS — N40.1 BPH WITH OBSTRUCTION/LOWER URINARY TRACT SYMPTOMS: ICD-10-CM

## 2022-10-27 DIAGNOSIS — N13.8 BPH WITH OBSTRUCTION/LOWER URINARY TRACT SYMPTOMS: ICD-10-CM

## 2022-10-27 PROCEDURE — 99214 OFFICE O/P EST MOD 30 MIN: CPT | Mod: S$GLB,,, | Performed by: UROLOGY

## 2022-10-27 PROCEDURE — 99214 PR OFFICE/OUTPT VISIT, EST, LEVL IV, 30-39 MIN: ICD-10-PCS | Mod: S$GLB,,, | Performed by: UROLOGY

## 2022-10-27 PROCEDURE — 1159F MED LIST DOCD IN RCRD: CPT | Mod: CPTII,S$GLB,, | Performed by: UROLOGY

## 2022-10-27 PROCEDURE — 1160F RVW MEDS BY RX/DR IN RCRD: CPT | Mod: CPTII,S$GLB,, | Performed by: UROLOGY

## 2022-10-27 PROCEDURE — 1160F PR REVIEW ALL MEDS BY PRESCRIBER/CLIN PHARMACIST DOCUMENTED: ICD-10-PCS | Mod: CPTII,S$GLB,, | Performed by: UROLOGY

## 2022-10-27 PROCEDURE — 1159F PR MEDICATION LIST DOCUMENTED IN MEDICAL RECORD: ICD-10-PCS | Mod: CPTII,S$GLB,, | Performed by: UROLOGY

## 2022-10-27 NOTE — PROGRESS NOTES
Subjective:       Patient ID: Kevan Rodriguez is a 77 y.o. male.    Chief Complaint: Yrly, H/O Urinary Retention, and Lower Abdominal Pain      HPI:  77-year-old male with history of urinary retention requiring Montes catheter he was scoped had fairly large prostate but was able to urinate and a TURP was not performed he is having some dementia issues feels like he has some lower abdominal pain    Past Medical History:   Past Medical History:   Diagnosis Date    Dementia     Diabetes mellitus     Hypertension     Urinary retention        Past Surgical Historical:   Past Surgical History:   Procedure Laterality Date    SHOULDER SURGERY Left         Medications:   Medication List with Changes/Refills   Current Medications    AMLODIPINE (NORVASC) 10 MG TABLET        ASPIRIN 81 MG CHEW    81 mg.    ATORVASTATIN (LIPITOR) 40 MG TABLET        BELSOMRA 10 MG TAB    Take 1 tablet by mouth nightly.    CONTOUR NEXT METER MISC    TEST BLOOD SUGAR DAILY    CONTOUR NEXT TEST STRIPS STRP        DOXEPIN (SINEQUAN) 10 MG CAPSULE        FINASTERIDE (PROSCAR) 5 MG TABLET    Take 1 tablet (5 mg total) by mouth once daily.    HYDROCHLOROTHIAZIDE (HYDRODIURIL) 12.5 MG TAB        JANUMET -1,000 MG TM24        LOSARTAN (COZAAR) 100 MG TABLET        MEMANTINE (NAMENDA) 10 MG TAB        MEMANTINE (NAMENDA) 5 MG TAB        METOPROLOL SUCCINATE (TOPROL-XL) 200 MG 24 HR TABLET        MIRABEGRON (MYRBETRIQ) 25 MG TB24 ER TABLET    Take 1 tablet (25 mg total) by mouth once daily.    NITROFURANTOIN, MACROCRYSTAL-MONOHYDRATE, (MACROBID) 100 MG CAPSULE        SYNTHROID 150 MCG TABLET        TAMSULOSIN (FLOMAX) 0.4 MG CAP    Take 1 capsule (0.4 mg total) by mouth once daily.        Past Social History:   Social History     Socioeconomic History    Marital status:    Tobacco Use    Smoking status: Never    Smokeless tobacco: Never   Substance and Sexual Activity    Alcohol use: Never    Drug use: Never       Allergies: Review of  patient's allergies indicates:  No Known Allergies     Family History: History reviewed. No pertinent family history.     Review of Systems:  Review of Systems   Constitutional:  Negative for activity change and appetite change.   HENT:  Negative for congestion and dental problem.    Eyes:  Negative for visual disturbance.   Respiratory:  Negative for chest tightness and shortness of breath.    Cardiovascular:  Negative for chest pain.   Gastrointestinal:  Negative for abdominal distention and abdominal pain.   Endocrine: Negative for polyuria.   Genitourinary:  Negative for difficulty urinating, dysuria, flank pain, frequency, hematuria, penile discharge, penile pain, penile swelling, scrotal swelling, testicular pain and urgency.   Musculoskeletal:  Negative for back pain and neck pain.   Skin:  Negative for color change.   Allergic/Immunologic: Positive for immunocompromised state.   Neurological:  Negative for dizziness.   Hematological:  Negative for adenopathy.   Psychiatric/Behavioral:  Negative for agitation, behavioral problems and confusion.      Physical Exam:  Physical Exam  Constitutional:       General: He is not in acute distress.     Appearance: He is well-developed.   HENT:      Head: Normocephalic and atraumatic.      Nose: Nose normal.   Eyes:      General: No scleral icterus.     Conjunctiva/sclera: Conjunctivae normal.      Pupils: Pupils are equal, round, and reactive to light.   Neck:      Thyroid: No thyromegaly.      Trachea: No tracheal deviation.   Cardiovascular:      Rate and Rhythm: Normal rate and regular rhythm.      Heart sounds: Normal heart sounds.   Pulmonary:      Effort: Pulmonary effort is normal. No respiratory distress.      Breath sounds: Normal breath sounds. No wheezing or rales.   Abdominal:      General: Bowel sounds are normal. There is no distension.      Palpations: Abdomen is soft.      Tenderness: There is no abdominal tenderness. There is no guarding or rebound.    Genitourinary:     Penis: Normal. No tenderness.       Prostate: Normal.   Musculoskeletal:         General: No deformity. Normal range of motion.      Cervical back: Neck supple.   Lymphadenopathy:      Cervical: No cervical adenopathy.   Skin:     General: Skin is warm and dry.      Findings: No erythema or rash.   Neurological:      Mental Status: He is alert and oriented to person, place, and time.      Cranial Nerves: No cranial nerve deficit.   Psychiatric:         Behavior: Behavior normal.       Assessment/Plan:       Problem List Items Addressed This Visit    None  Visit Diagnoses       Urinary retention    -  Primary    BPH with obstruction/lower urinary tract symptoms                   BPH with obstruction:   Patient's postvoid today in clinic is 150 cc overall he is asymptomatic doing well from a urinary standpoint I think is fine will refill his Flomax and finasteride return to clinic in

## 2022-11-04 ENCOUNTER — HOSPITAL ENCOUNTER (EMERGENCY)
Facility: HOSPITAL | Age: 77
Discharge: HOME | End: 2022-11-05
Attending: STUDENT IN AN ORGANIZED HEALTH CARE EDUCATION/TRAINING PROGRAM
Payer: MEDICARE

## 2022-11-04 DIAGNOSIS — R33.9 URINARY RETENTION: ICD-10-CM

## 2022-11-04 DIAGNOSIS — R10.84 GENERALIZED ABDOMINAL PAIN: Primary | ICD-10-CM

## 2022-11-04 PROCEDURE — 51702 INSERT TEMP BLADDER CATH: CPT

## 2022-11-04 PROCEDURE — 99285 EMERGENCY DEPT VISIT HI MDM: CPT | Mod: 25

## 2022-11-05 VITALS
DIASTOLIC BLOOD PRESSURE: 82 MMHG | WEIGHT: 175 LBS | SYSTOLIC BLOOD PRESSURE: 153 MMHG | BODY MASS INDEX: 23.7 KG/M2 | OXYGEN SATURATION: 95 % | HEART RATE: 63 BPM | RESPIRATION RATE: 18 BRPM | HEIGHT: 72 IN | TEMPERATURE: 99 F

## 2022-11-05 LAB
ALBUMIN SERPL-MCNC: 3.4 GM/DL (ref 3.4–4.8)
ALBUMIN/GLOB SERPL: 1.2 RATIO (ref 1.1–2)
ALP SERPL-CCNC: 60 UNIT/L (ref 40–150)
ALT SERPL-CCNC: 34 UNIT/L (ref 0–55)
AST SERPL-CCNC: 15 UNIT/L (ref 5–34)
BASOPHILS # BLD AUTO: 0.04 X10(3)/MCL (ref 0–0.2)
BASOPHILS NFR BLD AUTO: 0.4 %
BILIRUBIN DIRECT+TOT PNL SERPL-MCNC: 0.9 MG/DL
BUN SERPL-MCNC: 15.8 MG/DL (ref 8.4–25.7)
CALCIUM SERPL-MCNC: 8.9 MG/DL (ref 8.8–10)
CHLORIDE SERPL-SCNC: 110 MMOL/L (ref 98–107)
CO2 SERPL-SCNC: 19 MMOL/L (ref 23–31)
CREAT SERPL-MCNC: 0.84 MG/DL (ref 0.73–1.18)
EOSINOPHIL # BLD AUTO: 0.03 X10(3)/MCL (ref 0–0.9)
EOSINOPHIL NFR BLD AUTO: 0.3 %
ERYTHROCYTE [DISTWIDTH] IN BLOOD BY AUTOMATED COUNT: 13.2 % (ref 11.5–17)
GFR SERPLBLD CREATININE-BSD FMLA CKD-EPI: >60 MLS/MIN/1.73/M2
GLOBULIN SER-MCNC: 2.9 GM/DL (ref 2.4–3.5)
GLUCOSE SERPL-MCNC: 156 MG/DL (ref 82–115)
HCT VFR BLD AUTO: 30.9 % (ref 42–52)
HGB BLD-MCNC: 10.6 GM/DL (ref 14–18)
IMM GRANULOCYTES # BLD AUTO: 0.06 X10(3)/MCL (ref 0–0.04)
IMM GRANULOCYTES NFR BLD AUTO: 0.7 %
LACTATE SERPL-SCNC: 1 MMOL/L (ref 0.5–2.2)
LYMPHOCYTES # BLD AUTO: 0.62 X10(3)/MCL (ref 0.6–4.6)
LYMPHOCYTES NFR BLD AUTO: 6.8 %
MCH RBC QN AUTO: 31.9 PG (ref 27–31)
MCHC RBC AUTO-ENTMCNC: 34.3 MG/DL (ref 33–36)
MCV RBC AUTO: 93.1 FL (ref 80–94)
MONOCYTES # BLD AUTO: 0.59 X10(3)/MCL (ref 0.1–1.3)
MONOCYTES NFR BLD AUTO: 6.5 %
NEUTROPHILS # BLD AUTO: 7.8 X10(3)/MCL (ref 2.1–9.2)
NEUTROPHILS NFR BLD AUTO: 85.3 %
NRBC BLD AUTO-RTO: 0 %
PLATELET # BLD AUTO: 262 X10(3)/MCL (ref 130–400)
PMV BLD AUTO: 9.1 FL (ref 7.4–10.4)
POTASSIUM SERPL-SCNC: 3.6 MMOL/L (ref 3.5–5.1)
PROT SERPL-MCNC: 6.3 GM/DL (ref 5.8–7.6)
RBC # BLD AUTO: 3.32 X10(6)/MCL (ref 4.7–6.1)
SODIUM SERPL-SCNC: 138 MMOL/L (ref 136–145)
WBC # SPEC AUTO: 9.1 X10(3)/MCL (ref 4.5–11.5)

## 2022-11-05 PROCEDURE — 80053 COMPREHEN METABOLIC PANEL: CPT | Performed by: STUDENT IN AN ORGANIZED HEALTH CARE EDUCATION/TRAINING PROGRAM

## 2022-11-05 PROCEDURE — 83605 ASSAY OF LACTIC ACID: CPT | Performed by: STUDENT IN AN ORGANIZED HEALTH CARE EDUCATION/TRAINING PROGRAM

## 2022-11-05 PROCEDURE — 85025 COMPLETE CBC W/AUTO DIFF WBC: CPT | Performed by: STUDENT IN AN ORGANIZED HEALTH CARE EDUCATION/TRAINING PROGRAM

## 2022-11-05 PROCEDURE — 25500020 PHARM REV CODE 255: Performed by: STUDENT IN AN ORGANIZED HEALTH CARE EDUCATION/TRAINING PROGRAM

## 2022-11-05 RX ORDER — CEPHALEXIN 500 MG/1
500 CAPSULE ORAL 4 TIMES DAILY
Qty: 20 CAPSULE | Refills: 0 | Status: SHIPPED | OUTPATIENT
Start: 2022-11-05 | End: 2022-11-10

## 2022-11-05 RX ADMIN — IOPAMIDOL 100 ML: 755 INJECTION, SOLUTION INTRAVENOUS at 02:11

## 2022-11-05 NOTE — DISCHARGE INSTRUCTIONS
We discussed your case with our on-call urology specialist. You are appropriate for discharge home with follow up with your urologist.    Please take the antibiotics you have been prescribed as directed.    Please follow up with your primary care physician in 2-3 days.      Please return to the emergency department if you develop any worsening symptoms, fever, chest pain, difficulty breathing, or any other new symptoms or concerns.      Thank you for allowing us to take care of you today.

## 2022-11-05 NOTE — ED PROVIDER NOTES
"Encounter Date: 11/4/2022    SCRIBE #1 NOTE: I, Dana Cates, am scribing for, and in the presence of,  Chace Ho MD. I have scribed the following portions of the note - Other sections scribed: HPI, ROS, PE.     History     Chief Complaint   Patient presents with    Testicle Pain     To er per aasi, transfer from [facility] for testicular torsion.         This is a 77 y.o. male, with a PMHx of HTN, DM, urinary retention, and multiple myeloma, who presents with complaint of intermittent testicle pain with worsening onset yesterday at noon. Patient's wife reports that the patient has had 5 episodes of this pain since 1 week ago, and she adds that they come on from "time to time."  Wife notes that the patient did not urinate yesterday. Patient reports that the pain is "somewhere in the middle of 1-10." Wife adds that the patient had 2 Advil prior to arrival. Patient was sent from [facility], where he was diagnosed by ultrasound with concern for a testicular torsion. Patient has been on chemotherapy for 2 months for his multiple myeloma, but he was taken off chemo 1 week ago due to these episodes. Per wife, the patient has lesions on his hip bone from the multiple myeloma.      The history is provided by the patient and the spouse. No  was used.   Testicle Pain  This is a recurrent problem. The current episode started 6 to 12 hours ago. The problem has been gradually worsening. Pertinent negatives include no chest pain, no abdominal pain and no shortness of breath. Nothing aggravates the symptoms. Nothing relieves the symptoms.   Review of patient's allergies indicates:  No Known Allergies  Past Medical History:   Diagnosis Date    Dementia     Diabetes mellitus     Hypertension     Urinary retention      Past Surgical History:   Procedure Laterality Date    SHOULDER SURGERY Left      No family history on file.  Social History     Tobacco Use    Smoking status: Never    " Smokeless tobacco: Never   Substance Use Topics    Alcohol use: Never    Drug use: Never     Review of Systems   Constitutional:  Negative for chills and fever.   HENT:  Negative for drooling and sore throat.    Eyes:  Negative for pain and redness.   Respiratory:  Negative for shortness of breath, wheezing and stridor.    Cardiovascular:  Negative for chest pain, palpitations and leg swelling.   Gastrointestinal:  Negative for abdominal pain, nausea and vomiting.   Genitourinary:  Positive for decreased urine volume and testicular pain. Negative for dysuria and hematuria.   Musculoskeletal:  Negative for back pain, neck pain and neck stiffness.   Skin:  Negative for rash and wound.   Neurological:  Negative for weakness and numbness.   Hematological:  Does not bruise/bleed easily.     Physical Exam     Initial Vitals [11/04/22 2328]   BP Pulse Resp Temp SpO2   (!) 153/82 76 18 99.1 °F (37.3 °C) 100 %      MAP       --         Physical Exam    Nursing note and vitals reviewed.  Constitutional: He appears well-developed. He is not diaphoretic. No distress.   HENT:   Head: Normocephalic and atraumatic.   Nose: Nose normal.   Mouth/Throat: Oropharynx is clear and moist.   Eyes: Conjunctivae and EOM are normal. Pupils are equal, round, and reactive to light.   Neck: Neck supple.   Normal range of motion.  Cardiovascular:  Normal rate and regular rhythm.           No murmur heard.  Pulmonary/Chest: Breath sounds normal. No respiratory distress. He has no wheezes. He has no rales.   Abdominal: Abdomen is soft. He exhibits no distension. There is no abdominal tenderness.   Genitourinary:    Testes and penis normal.   Cremasteric reflex is present. Right testis shows no swelling and no tenderness. Left testis shows no swelling and no tenderness.    Genitourinary Comments: Tenderness to right inguinal area. Scrotum is non-tender, no overlying erythema, and not edematous.     Musculoskeletal:         General: No edema. Normal  range of motion.      Cervical back: Normal range of motion and neck supple.     Neurological: He is alert and oriented to person, place, and time. He has normal strength. No cranial nerve deficit.   Skin: Skin is warm. Capillary refill takes less than 2 seconds. No rash noted.   Psychiatric: He has a normal mood and affect. His behavior is normal.       ED Course   Procedures  Labs Reviewed   COMPREHENSIVE METABOLIC PANEL - Abnormal; Notable for the following components:       Result Value    Chloride 110 (*)     Carbon Dioxide 19 (*)     Glucose Level 156 (*)     All other components within normal limits   CBC WITH DIFFERENTIAL - Abnormal; Notable for the following components:    RBC 3.32 (*)     Hgb 10.6 (*)     Hct 30.9 (*)     MCH 31.9 (*)     IG# 0.06 (*)     All other components within normal limits   LACTIC ACID, PLASMA - Normal   CBC W/ AUTO DIFFERENTIAL    Narrative:     The following orders were created for panel order CBC auto differential.  Procedure                               Abnormality         Status                     ---------                               -----------         ------                     CBC with Differential[[id number]]        Abnormal            Final result                 Please view results for these tests on the individual orders.          Imaging Results              CT Abdomen Pelvis With Contrast (Final result)  Result time 11/05/22 08:50:20      Final result by Charlie Mccall MD (11/05/22 08:50:20)                   Impression:      1. Question ileus or enteritis.  2. Indeterminate left adrenal nodule.  This could be further evaluated with pre and post-contrast adrenal mass protocol CT of the abdomen if needed.  3. Moderate pericardial effusion.  4.  No significant discrepancy with the preliminary report.      Electronically signed by: Charlie Mccall  Date:    11/05/2022  Time:    08:50               Narrative:    EXAMINATION:  CT ABDOMEN PELVIS WITH CONTRAST    CLINICAL  HISTORY:  RLQ abdominal pain (Age >= 14y);    TECHNIQUE:  Helical acquisition through the abdomen and pelvis with IV contrast.  Three plane reconstructions were provided for review.  mGycm. Automatic exposure control, adjustment of mA/kV or iterative reconstruction technique was used to reduce radiation.    COMPARISON:  No priors    FINDINGS:  There is a moderate pericardial effusion.  Mild chronic changes at the lung bases.    No significant abnormality of the liver, gallbladder, spleen, pancreas.  There is indeterminate 25 mm left adrenal nodule.  There is no hydronephrosis or suspicious renal lesion.    Few mildly dilated small bowel loops with air-fluid levels.  No significant inflammatory changes of the bowel.  Moderate stool in the colon.    There is a Montes in the urinary bladder.  No pelvic free fluid.  Abdominal aorta normal in caliber.  Moderate atherosclerotic disease.  No retroperitoneal adenopathy.    Moderate degenerative change of the spine.                        Preliminary result by Charlie Mccall MD (11/05/22 02:36:45)                   Narrative:    START OF REPORT:  Technique: CT of the abdomen and pelvis was performed with axial images as well as sagittal and coronal reconstruction images without intravenous contrast.    Comparison: None available.    Clinical History: Rlq pain.    Dosage Information: Automated Exposure Control was utilized 970.51 mGy.cm.    Findings:  Technical notes: Mild breathing motion artifact is seen.  Lines and Tubes: There is a left upper quadrant peritoneal shunt seen inserted from the right subcutaneous subcostal aspect.  Thorax:  Lungs: There is minimal nonspecific dependent change at the lung bases. No focal infiltrate or consolidation is seen.  Pleura: There is trace bilateral pleural effusions seen with minimal bilateral pleural thickening.  Heart: The heart appears somewhat prominent. There is a medium sized pericardial effusion, with a thickness of 10mm  seen on series 2, image 4.  Abdomen:  Abdominal Wall: There is a small right inguinal hernia witch contains some fluid. The abdominal wall otherwise appears unremarkable.  Liver: The liver appears unremarkable.  Biliary System: No intrahepatic or extrahepatic biliary duct dilatation is seen.  Gallbladder: The gallbladder appears unremarkable with no stones wall thickening or pericholecystic inflammatory changes or fluid.  Pancreas: The pancreas appears unremarkable.  Spleen: The spleen appears unremarkable.  Adrenals: There is an ovoid isodense lesion seen arising from the lateral limb of the left adrenal gland measuring 2.5 cm. This is somewhat indeterminant.  Kidneys: Nonspecific perinephric fat stranding is noted bilaterally. Left kidney. Multiple cysts are identified in the left kidney the largest of which measures â3.8 mmâ is on âImage 68, Series 4â upper pole of the left kidney. Right kidney. A single cyst measuring â10.7 mmâ is seen on âImage 74, Series 4â in the upper pole of the right kidney. There is bilateral mild prominence of the pelvicalyceal system which may reflect vigorous diuresis.  Aorta: Unremarkable abdominal aorta without specific evidence of aneurysm or dissection. There is moderate calcification of the abdominal aorta and its branches.  IVC: Unremarkable.  Bowel: There are numerous non distended but fluid-filled loops of small bowel throughout the abdomen with portions of the colon also appearing fluid filled with some mild mucosal enhancement.  Esophagus: The visualized esophagus appears unremarkable.  Stomach: The stomach appears unremarkable.  Duodenum: Unremarkable appearing duodenum.  Small Bowel: Small bowel obstruction.  Appendix: The appendix appears unremarkable and is seen on. The cecum is subhepatic in location.  Peritoneum: Mild free fluid is seen in the pelvis.    Pelvis:  Bladder: There is a barker's catheter insitu in the non-distended bladder.  Male:  Prostate  gland: The prostate gland appears unremarkable.    Bony structures:  Dorsal Spine: There is moderate spondylosis of the visualized dorsal spine.  Bony Pelvis: There is moderate degenerative change of the hip.      Impression:  1. There is an ovoid isodense lesion seen arising from the lateral limb of the left adrenal gland measuring 2.5 cm. This is somewhat indeterminant. Follow-up as clinically indicated.  2. There are numerous non distended but fluid-filled loops of small bowel throughout the abdomen with portions of the colon also appearing fluid filled with some mild mucosal enhancement. These findings could reflect an element of mild enterocolitis. Correlate clinically.  3. There is a medium sized pericardial effusion, with a thickness of 10mm seen on series 2, image 4.  4. Details and other findings as discussed above.                          Preliminary result by Kin Price MD (11/05/22 02:36:45)                   Narrative:    START OF REPORT:  Technique: CT of the abdomen and pelvis was performed with axial images as well as sagittal and coronal reconstruction images without intravenous contrast.    Comparison: None available.    Clinical History: Rlq pain.    Dosage Information: Automated Exposure Control was utilized 970.51 mGy.cm.    Findings:  Technical notes: Mild breathing motion artifact is seen.  Lines and Tubes: There is a left upper quadrant peritoneal shunt seen inserted from the right subcutaneous subcostal aspect.  Thorax:  Lungs: There is minimal nonspecific dependent change at the lung bases. No focal infiltrate or consolidation is seen.  Pleura: There is trace bilateral pleural effusions seen with minimal bilateral pleural thickening.  Heart: The heart appears somewhat prominent. There is a medium sized pericardial effusion, with a thickness of 10mm seen on series 2, image 4.  Abdomen:  Abdominal Wall: There is a small right inguinal hernia witch contains some fluid. The abdominal wall  otherwise appears unremarkable.  Liver: The liver appears unremarkable.  Biliary System: No intrahepatic or extrahepatic biliary duct dilatation is seen.  Gallbladder: The gallbladder appears unremarkable with no stones wall thickening or pericholecystic inflammatory changes or fluid.  Pancreas: The pancreas appears unremarkable.  Spleen: The spleen appears unremarkable.  Adrenals: There is an ovoid isodense lesion seen arising from the lateral limb of the left adrenal gland measuring 2.5 cm. This is somewhat indeterminant.  Kidneys: Nonspecific perinephric fat stranding is noted bilaterally. Left kidney. Multiple cysts are identified in the left kidney the largest of which measures â3.8 mmâ is on âImage 68, Series 4â upper pole of the left kidney. Right kidney. A single cyst measuring â10.7 mmâ is seen on âImage 74, Series 4â in the upper pole of the right kidney. There is bilateral mild prominence of the pelvicalyceal system which may reflect vigorous diuresis.  Aorta: Unremarkable abdominal aorta without specific evidence of aneurysm or dissection. There is moderate calcification of the abdominal aorta and its branches.  IVC: Unremarkable.  Bowel: There are numerous non distended but fluid-filled loops of small bowel throughout the abdomen with portions of the colon also appearing fluid filled with some mild mucosal enhancement.  Esophagus: The visualized esophagus appears unremarkable.  Stomach: The stomach appears unremarkable.  Duodenum: Unremarkable appearing duodenum.  Small Bowel: Small bowel obstruction.  Appendix: The appendix appears unremarkable and is seen on. The cecum is subhepatic in location.  Peritoneum: Mild free fluid is seen in the pelvis.    Pelvis:  Bladder: There is a barker's catheter insitu in the non-distended bladder.  Male:  Prostate gland: The prostate gland appears unremarkable.    Bony structures:  Dorsal Spine: There is moderate spondylosis of the visualized dorsal  spine.  Bony Pelvis: There is moderate degenerative change of the hip.      Impression:  1. There is an ovoid isodense lesion seen arising from the lateral limb of the left adrenal gland measuring 2.5 cm. This is somewhat indeterminant. Follow-up as clinically indicated.  2. There are numerous non distended but fluid-filled loops of small bowel throughout the abdomen with portions of the colon also appearing fluid filled with some mild mucosal enhancement. These findings could reflect an element of mild enterocolitis. Correlate clinically.  3. There is a medium sized pericardial effusion, with a thickness of 10mm seen on series 2, image 4.  4. Details and other findings as discussed above.                                         Medications   iopamidoL (ISOVUE-370) injection 100 mL (100 mLs Intravenous Given 11/5/22 0239)     Medical Decision Making:   History:   Old Medical Records: I decided to obtain old medical records.  Differential Diagnosis:   Epididymitis, orchitis, testicular torsion, hernia, colitis  Clinical Tests:   Lab Tests: Ordered and Reviewed  Radiological Study: Ordered and Reviewed  ED Management:  MDM: Kevan Rodriguez is a 77 y.o. male with above past medical history who presents to the ED for testicle and abdominal pain. Vital signs reviewed.  Patient arrives as a transfer from outside hospital for Urology evaluation of a suspected testicular torsion seen on outside hospital ultrasound.  Patient currently denies any testicular pain or swelling.  His abdomen is soft he does have some tenderness to palpation to the right lower quadrant and right inguinal region.  Labs are pending.  Pain and nausea control as needed.  Urology has been consulted, appreciate recommendations.  CT of the abdomen and pelvis is pending to rule out other acute intra-abdominal pathology.  Patient agrees with the plan of care and all questions answered at bedside.    Update:  Urology has been consulted and has reviewed the  imaging and the case has been discussed.  They state that the patient has flow to the testicle there is no concern for torsion at this time.  CT of the abdomen and pelvis has been reviewed with some findings of acute urinary retention with no other acute abnormalities aside from some fluid-filled loops of bowel consistent with enteritis.  Incidental finding of an ill-defined adrenal nodule vs mass has been discussed with the patient. Patient states he will follow up with his PCP about this finding. Patient has had a Montes catheter placed with adequate drainage of his bladder.  Leg bag has been provided.  Patient reports no pain at this time. He is tolerating oral intake well.  He has remained afebrile and hemodynamically stable in the emergency department.  He states he wishes to be discharged home to follow-up with his urologist.  Strict return precautions have been discussed the patient has verbalized understanding.    Dispo:  Discharge    Data Reviewed/Counseling: I have personally reviewed the patient's vital signs, nursing notes, and other relevant tests, information, and imaging. I had a detailed discussion regarding the historical points, exam findings, and any diagnostic results supporting the discharge diagnosis.     Portions of this note were dictated using voice recognition software. Although it was reviewed for accuracy, some inherent voice recognition errors may have occurred and be present in this document.          Scribe Attestation:   Scribe #1: I performed the above scribed service and the documentation accurately describes the services I performed. I attest to the accuracy of the note.    Attending Attestation:           Physician Attestation for Scribe:  Physician Attestation Statement for Scribe #1: I, Chace Ho MD, reviewed documentation, as scribed by Dana Cates in my presence, and it is both accurate and complete.           ED Course as of 11/08/22 0436   Sat Nov 05, 2022   0112  Urology consulted, imaging discussed, low suspicion for torsion, urology states patient is stable for discharge home with outpatient follow up. []   0300 CT Abdomen Pelvis With Contrast  Incidental adrenal nodule/finding discussed with patient. [MC]      ED Course User Index  [MC] Chace Ho MD                 Clinical Impression:   Final diagnoses:  [R10.84] Generalized abdominal pain (Primary)  [R33.9] Urinary retention      ED Disposition Condition    Discharge Stable          ED Prescriptions       Medication Sig Dispense Start Date End Date Auth. Provider    cephALEXin (KEFLEX) 500 MG capsule Take 1 capsule (500 mg total) by mouth 4 (four) times daily. for 5 days 20 capsule 11/5/2022 11/10/2022 Chace Ho MD          Follow-up Information       Follow up With Specialties Details Why Contact Info    Shirley Roger  In 2 days  [address] [facility] [address] [phone]      Topher Pimentel MD Urology In 2 days  [address] [facility]  [address]  [facility] [id number] [phone]      [facility] - Emergency Dept Emergency Medicine  If symptoms worsen [address] [facility] [address] [phone]             Chace Ho MD  11/08/22 0422

## 2022-11-11 ENCOUNTER — TELEPHONE (OUTPATIENT)
Dept: UROLOGY | Facility: CLINIC | Age: 77
End: 2022-11-11
Payer: MEDICARE

## 2022-11-11 NOTE — TELEPHONE ENCOUNTER
----- Message from Gema Willoughby sent at 11/11/2022 11:56 AM CST -----  Contact: pt  Pt was told to come in monday 1050 to remove catheter but needs to come in at another time pt has different appt     [phone]

## 2022-11-15 ENCOUNTER — OFFICE VISIT (OUTPATIENT)
Dept: UROLOGY | Facility: CLINIC | Age: 77
End: 2022-11-15
Payer: MEDICARE

## 2022-11-15 VITALS
BODY MASS INDEX: 23.7 KG/M2 | TEMPERATURE: 98 F | HEIGHT: 72 IN | SYSTOLIC BLOOD PRESSURE: 110 MMHG | RESPIRATION RATE: 18 BRPM | DIASTOLIC BLOOD PRESSURE: 59 MMHG | HEART RATE: 82 BPM | WEIGHT: 175 LBS

## 2022-11-15 DIAGNOSIS — R33.9 URINARY RETENTION: Primary | ICD-10-CM

## 2022-11-15 DIAGNOSIS — N50.819 PAIN IN TESTICLE, UNSPECIFIED LATERALITY: ICD-10-CM

## 2022-11-15 PROCEDURE — 99214 PR OFFICE/OUTPT VISIT, EST, LEVL IV, 30-39 MIN: ICD-10-PCS | Mod: S$GLB,,, | Performed by: UROLOGY

## 2022-11-15 PROCEDURE — 99214 OFFICE O/P EST MOD 30 MIN: CPT | Mod: S$GLB,,, | Performed by: UROLOGY

## 2022-11-15 PROCEDURE — 3074F SYST BP LT 130 MM HG: CPT | Mod: CPTII,S$GLB,, | Performed by: UROLOGY

## 2022-11-15 PROCEDURE — 1160F RVW MEDS BY RX/DR IN RCRD: CPT | Mod: CPTII,S$GLB,, | Performed by: UROLOGY

## 2022-11-15 PROCEDURE — 1125F AMNT PAIN NOTED PAIN PRSNT: CPT | Mod: CPTII,S$GLB,, | Performed by: UROLOGY

## 2022-11-15 PROCEDURE — 1101F PR PT FALLS ASSESS DOC 0-1 FALLS W/OUT INJ PAST YR: ICD-10-PCS | Mod: CPTII,S$GLB,, | Performed by: UROLOGY

## 2022-11-15 PROCEDURE — 3288F PR FALLS RISK ASSESSMENT DOCUMENTED: ICD-10-PCS | Mod: CPTII,S$GLB,, | Performed by: UROLOGY

## 2022-11-15 PROCEDURE — 1101F PT FALLS ASSESS-DOCD LE1/YR: CPT | Mod: CPTII,S$GLB,, | Performed by: UROLOGY

## 2022-11-15 PROCEDURE — 1159F MED LIST DOCD IN RCRD: CPT | Mod: CPTII,S$GLB,, | Performed by: UROLOGY

## 2022-11-15 PROCEDURE — 3288F FALL RISK ASSESSMENT DOCD: CPT | Mod: CPTII,S$GLB,, | Performed by: UROLOGY

## 2022-11-15 PROCEDURE — 3078F PR MOST RECENT DIASTOLIC BLOOD PRESSURE < 80 MM HG: ICD-10-PCS | Mod: CPTII,S$GLB,, | Performed by: UROLOGY

## 2022-11-15 PROCEDURE — 3078F DIAST BP <80 MM HG: CPT | Mod: CPTII,S$GLB,, | Performed by: UROLOGY

## 2022-11-15 PROCEDURE — 1159F PR MEDICATION LIST DOCUMENTED IN MEDICAL RECORD: ICD-10-PCS | Mod: CPTII,S$GLB,, | Performed by: UROLOGY

## 2022-11-15 PROCEDURE — 1125F PR PAIN SEVERITY QUANTIFIED, PAIN PRESENT: ICD-10-PCS | Mod: CPTII,S$GLB,, | Performed by: UROLOGY

## 2022-11-15 PROCEDURE — 3074F PR MOST RECENT SYSTOLIC BLOOD PRESSURE < 130 MM HG: ICD-10-PCS | Mod: CPTII,S$GLB,, | Performed by: UROLOGY

## 2022-11-15 PROCEDURE — 1160F PR REVIEW ALL MEDS BY PRESCRIBER/CLIN PHARMACIST DOCUMENTED: ICD-10-PCS | Mod: CPTII,S$GLB,, | Performed by: UROLOGY

## 2022-11-15 RX ORDER — HYOSCYAMINE SULFATE 0.125 MG
TABLET ORAL
COMMUNITY
Start: 2022-11-07

## 2022-11-15 RX ORDER — TRAMADOL HYDROCHLORIDE 50 MG/1
50 TABLET ORAL 3 TIMES DAILY
COMMUNITY
Start: 2022-10-31

## 2022-11-15 NOTE — PROGRESS NOTES
Subjective:       Patient ID: Kevan Rodriguez is a 77 y.o. male.    Chief Complaint: No chief complaint on file.      HPI:  77-year-old male with history of urinary retention and testicular pain he has been scoped in the past postop urinary retention after a shunt procedure.  He is back today with a catheter he was seen in emergency room they told him he had testicular torsion which was incorrect    Past Medical History:   Past Medical History:   Diagnosis Date    Dementia     Diabetes mellitus     Hypertension     Urinary retention        Past Surgical Historical:   Past Surgical History:   Procedure Laterality Date    neurosurgery-shunt placed      SHOULDER SURGERY Left         Medications:   Medication List with Changes/Refills   Current Medications    AMLODIPINE (NORVASC) 10 MG TABLET        ASPIRIN 81 MG CHEW    81 mg.    ATORVASTATIN (LIPITOR) 40 MG TABLET        CONTOUR NEXT METER MISC    TEST BLOOD SUGAR DAILY    CONTOUR NEXT TEST STRIPS STRP        FINASTERIDE (PROSCAR) 5 MG TABLET    Take 1 tablet (5 mg total) by mouth once daily.    HYOSCYAMINE (ANASPAZ,LEVSIN) 0.125 MG TAB    TAKE 1 TABLET BY MOUTH THREE TIMES DAILY BEFORE A MEAL    JANUMET -1,000 MG TM24        LOSARTAN (COZAAR) 100 MG TABLET        MEMANTINE (NAMENDA) 10 MG TAB        MEMANTINE (NAMENDA) 5 MG TAB        METOPROLOL SUCCINATE (TOPROL-XL) 200 MG 24 HR TABLET        SYNTHROID 150 MCG TABLET        TAMSULOSIN (FLOMAX) 0.4 MG CAP    Take 1 capsule (0.4 mg total) by mouth once daily.    TRAMADOL (ULTRAM) 50 MG TABLET    Take 50 mg by mouth 3 (three) times daily.   Discontinued Medications    BELSOMRA 10 MG TAB    Take 1 tablet by mouth nightly.    DOXEPIN (SINEQUAN) 10 MG CAPSULE        HYDROCHLOROTHIAZIDE (HYDRODIURIL) 12.5 MG TAB        MIRABEGRON (MYRBETRIQ) 25 MG TB24 ER TABLET    Take 1 tablet (25 mg total) by mouth once daily.    NITROFURANTOIN, MACROCRYSTAL-MONOHYDRATE, (MACROBID) 100 MG CAPSULE            Past Social History:    Social History     Socioeconomic History    Marital status:    Tobacco Use    Smoking status: Never    Smokeless tobacco: Never   Substance and Sexual Activity    Alcohol use: Never    Drug use: Never       Allergies: Review of patient's allergies indicates:  No Known Allergies     Family History: History reviewed. No pertinent family history.     Review of Systems:  Review of Systems   Constitutional:  Negative for activity change and appetite change.   HENT:  Negative for congestion and dental problem.    Eyes:  Negative for visual disturbance.   Respiratory:  Negative for chest tightness and shortness of breath.    Cardiovascular:  Negative for chest pain.   Gastrointestinal:  Negative for abdominal distention and abdominal pain.   Endocrine: Negative for polyuria.   Genitourinary:  Negative for difficulty urinating, dysuria, flank pain, frequency, hematuria, penile discharge, penile pain, penile swelling, scrotal swelling, testicular pain and urgency.   Musculoskeletal:  Negative for back pain and neck pain.   Skin:  Negative for color change.   Allergic/Immunologic: Positive for immunocompromised state.   Neurological:  Negative for dizziness.   Hematological:  Negative for adenopathy.   Psychiatric/Behavioral:  Negative for agitation, behavioral problems and confusion.      Physical Exam:  Physical Exam  Constitutional:       General: He is not in acute distress.     Appearance: He is well-developed.   HENT:      Head: Normocephalic and atraumatic.      Nose: Nose normal.   Eyes:      General: No scleral icterus.     Conjunctiva/sclera: Conjunctivae normal.      Pupils: Pupils are equal, round, and reactive to light.   Neck:      Thyroid: No thyromegaly.      Trachea: No tracheal deviation.   Cardiovascular:      Rate and Rhythm: Normal rate and regular rhythm.      Heart sounds: Normal heart sounds.   Pulmonary:      Effort: Pulmonary effort is normal. No respiratory distress.      Breath sounds:  Normal breath sounds. No wheezing or rales.   Abdominal:      General: Bowel sounds are normal. There is no distension.      Palpations: Abdomen is soft.      Tenderness: There is no abdominal tenderness. There is no guarding or rebound.   Genitourinary:     Penis: Normal. No tenderness.       Prostate: Normal.   Musculoskeletal:         General: No deformity. Normal range of motion.      Cervical back: Neck supple.   Lymphadenopathy:      Cervical: No cervical adenopathy.   Skin:     General: Skin is warm and dry.      Findings: No erythema or rash.   Neurological:      Mental Status: He is alert and oriented to person, place, and time.      Cranial Nerves: No cranial nerve deficit.   Psychiatric:         Behavior: Behavior normal.       Assessment/Plan:       Problem List Items Addressed This Visit    None  Visit Diagnoses       Urinary retention    -  Primary    Pain in testicle, unspecified laterality                   Urinary retention:  We will attempt a voiding trial today patient has gone into retention now twice we had a conversation about Transurethral resection of the prostate as a way to avoid this in the future they are interested    Testicular pain:  Patient's symptoms have resolved after antibiotics

## 2022-11-15 NOTE — PROGRESS NOTES
Patient presented to clinic for voiding trial and catheter removal. 100ml clear yellow urine emptied via catheter/cath bag. 450cc sterile water inserted into bladder via catheter, sterile technique maintained. 10cc removed from catheter balloon, 18F barker catheter removed and 300ml of clear yellow urine voided into urinal. Advised pt to continue to hydrate adequately with clear liquids and call clinic before 2pm should any discomfort or difficulty urinating occur. Pt verbalized understanding. lpn

## 2023-08-10 DIAGNOSIS — R33.9 URINARY RETENTION: ICD-10-CM

## 2023-08-10 RX ORDER — FINASTERIDE 5 MG/1
5 TABLET, FILM COATED ORAL
Qty: 90 TABLET | Refills: 3 | Status: SHIPPED | OUTPATIENT
Start: 2023-08-10

## 2023-08-10 NOTE — TELEPHONE ENCOUNTER
Please see the attached refill request. Hes been here in the last 8 onths and has a follow up scheduled for 10/23. Thanks

## 2023-10-31 ENCOUNTER — OFFICE VISIT (OUTPATIENT)
Dept: UROLOGY | Facility: CLINIC | Age: 78
End: 2023-10-31
Payer: MEDICARE

## 2023-10-31 VITALS — HEIGHT: 73 IN | WEIGHT: 199 LBS | BODY MASS INDEX: 26.37 KG/M2

## 2023-10-31 DIAGNOSIS — Z87.898 HISTORY OF URINARY RETENTION: Primary | ICD-10-CM

## 2023-10-31 DIAGNOSIS — R10.31 GROIN PAIN, RIGHT: ICD-10-CM

## 2023-10-31 PROCEDURE — 1159F PR MEDICATION LIST DOCUMENTED IN MEDICAL RECORD: ICD-10-PCS | Mod: CPTII,S$GLB,, | Performed by: UROLOGY

## 2023-10-31 PROCEDURE — 99214 OFFICE O/P EST MOD 30 MIN: CPT | Mod: S$GLB,,, | Performed by: UROLOGY

## 2023-10-31 PROCEDURE — 1159F MED LIST DOCD IN RCRD: CPT | Mod: CPTII,S$GLB,, | Performed by: UROLOGY

## 2023-10-31 PROCEDURE — 99214 PR OFFICE/OUTPT VISIT, EST, LEVL IV, 30-39 MIN: ICD-10-PCS | Mod: S$GLB,,, | Performed by: UROLOGY

## 2023-10-31 PROCEDURE — 1101F PR PT FALLS ASSESS DOC 0-1 FALLS W/OUT INJ PAST YR: ICD-10-PCS | Mod: CPTII,S$GLB,, | Performed by: UROLOGY

## 2023-10-31 PROCEDURE — 3288F FALL RISK ASSESSMENT DOCD: CPT | Mod: CPTII,S$GLB,, | Performed by: UROLOGY

## 2023-10-31 PROCEDURE — 1126F PR PAIN SEVERITY QUANTIFIED, NO PAIN PRESENT: ICD-10-PCS | Mod: CPTII,S$GLB,, | Performed by: UROLOGY

## 2023-10-31 PROCEDURE — 1101F PT FALLS ASSESS-DOCD LE1/YR: CPT | Mod: CPTII,S$GLB,, | Performed by: UROLOGY

## 2023-10-31 PROCEDURE — 3288F PR FALLS RISK ASSESSMENT DOCUMENTED: ICD-10-PCS | Mod: CPTII,S$GLB,, | Performed by: UROLOGY

## 2023-10-31 PROCEDURE — 1126F AMNT PAIN NOTED NONE PRSNT: CPT | Mod: CPTII,S$GLB,, | Performed by: UROLOGY

## 2023-10-31 RX ORDER — MIRABEGRON 50 MG/1
TABLET, FILM COATED, EXTENDED RELEASE ORAL
COMMUNITY
Start: 2023-10-05

## 2023-10-31 RX ORDER — LINACLOTIDE 290 UG/1
CAPSULE, GELATIN COATED ORAL
COMMUNITY
Start: 2023-07-11

## 2023-10-31 RX ORDER — NYSTATIN 100000 U/G
CREAM TOPICAL
COMMUNITY
Start: 2023-10-23

## 2023-10-31 RX ORDER — DEXAMETHASONE 6 MG/1
TABLET ORAL
COMMUNITY
Start: 2023-10-06

## 2023-10-31 RX ORDER — QUETIAPINE FUMARATE 25 MG/1
TABLET, FILM COATED ORAL
COMMUNITY
Start: 2023-08-13

## 2023-10-31 RX ORDER — ROSUVASTATIN CALCIUM 20 MG/1
TABLET, COATED ORAL
COMMUNITY
Start: 2023-09-19

## 2023-10-31 RX ORDER — LEVOTHYROXINE SODIUM 112 UG/1
TABLET ORAL
COMMUNITY
Start: 2023-10-08

## 2023-10-31 NOTE — PROGRESS NOTES
Subjective:       Patient ID: Kevan Rodriguez is a 78 y.o. male.    Chief Complaint: No chief complaint on file.      HPI:  78-year-old male with history of urinary retention and testicular pain he has been scoped in the past postop urinary retention after a shunt procedure.  He is here for yearly follow up.  Patient doing well.  He does complain of some mild right groin pain.  Patient had a hernia repair in January.    Past Medical History:   Past Medical History:   Diagnosis Date    Dementia     Diabetes mellitus     Hypertension     Urinary retention        Past Surgical Historical:   Past Surgical History:   Procedure Laterality Date    neurosurgery-shunt placed      SHOULDER SURGERY Left         Medications:   Medication List with Changes/Refills   Current Medications    AMLODIPINE (NORVASC) 10 MG TABLET        ASPIRIN 81 MG CHEW    81 mg.    CONTOUR NEXT METER MISC    TEST BLOOD SUGAR DAILY    CONTOUR NEXT TEST STRIPS STRP        DEXAMETHASONE (DECADRON) 6 MG TABLET    TAKE 2 TABLETS BY MOUTH ONCE A WEEK    FINASTERIDE (PROSCAR) 5 MG TABLET    TAKE 1 TABLET DAILY    HYOSCYAMINE (ANASPAZ,LEVSIN) 0.125 MG TAB    TAKE 1 TABLET BY MOUTH THREE TIMES DAILY BEFORE A MEAL    LINZESS 290 MCG CAP CAPSULE        LOSARTAN (COZAAR) 100 MG TABLET        MEMANTINE (NAMENDA) 10 MG TAB        METOPROLOL SUCCINATE (TOPROL-XL) 200 MG 24 HR TABLET        MYRBETRIQ 50 MG TB24        NYSTATIN (MYCOSTATIN) CREAM        QUETIAPINE (SEROQUEL) 25 MG TAB        ROSUVASTATIN (CRESTOR) 20 MG TABLET        SYNTHROID 112 MCG TABLET        TAMSULOSIN (FLOMAX) 0.4 MG CAP    Take 1 capsule (0.4 mg total) by mouth once daily.    TRAMADOL (ULTRAM) 50 MG TABLET    Take 50 mg by mouth 3 (three) times daily.   Discontinued Medications    ATORVASTATIN (LIPITOR) 40 MG TABLET        JANUMET -1,000 MG TM24        MEMANTINE (NAMENDA) 5 MG TAB        SYNTHROID 150 MCG TABLET            Past Social History:   Social History     Socioeconomic  History    Marital status:    Tobacco Use    Smoking status: Never    Smokeless tobacco: Never   Substance and Sexual Activity    Alcohol use: Never    Drug use: Never       Allergies: Review of patient's allergies indicates:  No Known Allergies     Family History: No family history on file.     Review of Systems:  Review of Systems   Constitutional:  Negative for activity change and appetite change.   HENT:  Negative for congestion and dental problem.    Eyes:  Negative for visual disturbance.   Respiratory:  Negative for chest tightness and shortness of breath.    Cardiovascular:  Negative for chest pain.   Gastrointestinal:  Negative for abdominal distention and abdominal pain.   Endocrine: Negative for polyuria.   Genitourinary:  Negative for difficulty urinating, dysuria, flank pain, frequency, hematuria, penile discharge, penile pain, penile swelling, scrotal swelling, testicular pain and urgency.   Musculoskeletal:  Negative for back pain and neck pain.   Skin:  Negative for color change.   Allergic/Immunologic: Positive for immunocompromised state.   Neurological:  Negative for dizziness.   Hematological:  Negative for adenopathy.   Psychiatric/Behavioral:  Negative for agitation, behavioral problems and confusion.        Physical Exam:  Physical Exam  Constitutional:       General: He is not in acute distress.     Appearance: He is well-developed.   HENT:      Head: Normocephalic and atraumatic.      Nose: Nose normal.   Eyes:      General: No scleral icterus.     Conjunctiva/sclera: Conjunctivae normal.      Pupils: Pupils are equal, round, and reactive to light.   Neck:      Thyroid: No thyromegaly.      Trachea: No tracheal deviation.   Cardiovascular:      Rate and Rhythm: Normal rate and regular rhythm.      Heart sounds: Normal heart sounds.   Pulmonary:      Effort: Pulmonary effort is normal. No respiratory distress.      Breath sounds: Normal breath sounds. No wheezing or rales.   Abdominal:       General: Bowel sounds are normal. There is no distension.      Palpations: Abdomen is soft.      Tenderness: There is no abdominal tenderness. There is no guarding or rebound.   Genitourinary:     Penis: Normal. No tenderness.       Prostate: Normal.   Musculoskeletal:         General: No deformity. Normal range of motion.      Cervical back: Neck supple.   Lymphadenopathy:      Cervical: No cervical adenopathy.   Skin:     General: Skin is warm and dry.      Findings: No erythema or rash.   Neurological:      Mental Status: He is alert and oriented to person, place, and time.      Cranial Nerves: No cranial nerve deficit.   Psychiatric:         Behavior: Behavior normal.         Assessment/Plan:       Urinary retention:  PVR today is 10 mL.  Patient doing well and has no complaints at this time.  Follow up as needed for this issue.    Groin pain:  No abnormalities palpated upon examination.  Instructed patient to follow up with surgeon that repaired his hernia.  Problem List Items Addressed This Visit    None

## 2024-03-25 ENCOUNTER — TELEPHONE (OUTPATIENT)
Dept: UROLOGY | Facility: CLINIC | Age: 79
End: 2024-03-25
Payer: MEDICARE

## 2024-03-25 NOTE — TELEPHONE ENCOUNTER
Contacted, spoke with spouse she states that pt have a skin growth, pt is not circumcised. F/U appt, gio. BJP      . --- Message from Ivette Schaefer sent at 3/25/2024 10:00 AM CDT -----  Contact: Pat/wife. S  Pat is calling regards to concerns of skin growth and uti. Please call her at [phone].    Thanks  Td

## 2024-03-27 ENCOUNTER — OFFICE VISIT (OUTPATIENT)
Dept: UROLOGY | Facility: CLINIC | Age: 79
End: 2024-03-27
Payer: MEDICARE

## 2024-03-27 VITALS
WEIGHT: 199.75 LBS | HEIGHT: 73 IN | HEART RATE: 63 BPM | BODY MASS INDEX: 26.47 KG/M2 | SYSTOLIC BLOOD PRESSURE: 135 MMHG | DIASTOLIC BLOOD PRESSURE: 63 MMHG

## 2024-03-27 DIAGNOSIS — N47.1 PHIMOSIS: ICD-10-CM

## 2024-03-27 DIAGNOSIS — R30.0 DYSURIA: ICD-10-CM

## 2024-03-27 DIAGNOSIS — N13.8 BPH WITH OBSTRUCTION/LOWER URINARY TRACT SYMPTOMS: ICD-10-CM

## 2024-03-27 DIAGNOSIS — N40.1 BPH WITH OBSTRUCTION/LOWER URINARY TRACT SYMPTOMS: ICD-10-CM

## 2024-03-27 DIAGNOSIS — R82.90 ABNORMAL URINALYSIS: Primary | ICD-10-CM

## 2024-03-27 LAB
BILIRUBIN, UA POC OHS: NEGATIVE
BLOOD, UA POC OHS: ABNORMAL
CLARITY, UA POC OHS: ABNORMAL
COLOR, UA POC OHS: YELLOW
GLUCOSE, UA POC OHS: NEGATIVE
KETONES, UA POC OHS: NEGATIVE
LEUKOCYTES, UA POC OHS: ABNORMAL
NITRITE, UA POC OHS: POSITIVE
PH, UA POC OHS: 6
PROTEIN, UA POC OHS: 30
SPECIFIC GRAVITY, UA POC OHS: 1.02
UROBILINOGEN, UA POC OHS: 0.2

## 2024-03-27 PROCEDURE — 3075F SYST BP GE 130 - 139MM HG: CPT | Mod: CPTII,S$GLB,, | Performed by: FAMILY MEDICINE

## 2024-03-27 PROCEDURE — 1159F MED LIST DOCD IN RCRD: CPT | Mod: CPTII,S$GLB,, | Performed by: FAMILY MEDICINE

## 2024-03-27 PROCEDURE — 99214 OFFICE O/P EST MOD 30 MIN: CPT | Mod: GW,S$GLB,, | Performed by: FAMILY MEDICINE

## 2024-03-27 PROCEDURE — 3078F DIAST BP <80 MM HG: CPT | Mod: CPTII,S$GLB,, | Performed by: FAMILY MEDICINE

## 2024-03-27 PROCEDURE — 81003 URINALYSIS AUTO W/O SCOPE: CPT | Mod: QW,GW,S$GLB, | Performed by: FAMILY MEDICINE

## 2024-03-27 RX ORDER — SULFAMETHOXAZOLE AND TRIMETHOPRIM 800; 160 MG/1; MG/1
1 TABLET ORAL 2 TIMES DAILY
Qty: 14 TABLET | Refills: 0 | Status: SHIPPED | OUTPATIENT
Start: 2024-03-27 | End: 2024-04-03

## 2024-03-27 NOTE — PROGRESS NOTES
Subjective:       Patient ID: Kevan Rodriguez is a 78 y.o. male.    Chief Complaint: No chief complaint on file.      HPI: 78-year-old male patient presenting to the clinic to follow up with a complaint of frequency as well as weak urinary stream.  Patient has a history of urinary retention.  He is currently prescribed finasteride and Flomax.  He also complains of difficulty retracting the foreskin of the penis.         Past Medical History:   Past Medical History:   Diagnosis Date    Dementia     Diabetes mellitus     Hypertension     Urinary retention        Past Surgical Historical:   Past Surgical History:   Procedure Laterality Date    neurosurgery-shunt placed      SHOULDER SURGERY Left         Medications:   Medication List with Changes/Refills   New Medications    SULFAMETHOXAZOLE-TRIMETHOPRIM 800-160MG (BACTRIM DS) 800-160 MG TAB    Take 1 tablet by mouth 2 (two) times daily. for 7 days   Current Medications    AMLODIPINE (NORVASC) 10 MG TABLET        ASPIRIN 81 MG CHEW    81 mg.    CONTOUR NEXT METER MISC    TEST BLOOD SUGAR DAILY    CONTOUR NEXT TEST STRIPS STRP        DEXAMETHASONE (DECADRON) 6 MG TABLET    TAKE 2 TABLETS BY MOUTH ONCE A WEEK    FINASTERIDE (PROSCAR) 5 MG TABLET    TAKE 1 TABLET DAILY    HYOSCYAMINE (ANASPAZ,LEVSIN) 0.125 MG TAB    TAKE 1 TABLET BY MOUTH THREE TIMES DAILY BEFORE A MEAL    LINZESS 290 MCG CAP CAPSULE        LOSARTAN (COZAAR) 100 MG TABLET        MEMANTINE (NAMENDA) 10 MG TAB        METOPROLOL SUCCINATE (TOPROL-XL) 200 MG 24 HR TABLET        MYRBETRIQ 50 MG TB24        NYSTATIN (MYCOSTATIN) CREAM        QUETIAPINE (SEROQUEL) 25 MG TAB        ROSUVASTATIN (CRESTOR) 20 MG TABLET        SYNTHROID 112 MCG TABLET        TAMSULOSIN (FLOMAX) 0.4 MG CAP    Take 1 capsule (0.4 mg total) by mouth once daily.    TRAMADOL (ULTRAM) 50 MG TABLET    Take 50 mg by mouth 3 (three) times daily.        Past Social History:   Social History     Socioeconomic History    Marital status:     Tobacco Use    Smoking status: Never    Smokeless tobacco: Never   Substance and Sexual Activity    Alcohol use: Never    Drug use: Never       Allergies:   Review of patient's allergies indicates:   Allergen Reactions    Acetaminophen     Oxycodone         Family History: No family history on file.     Review of Systems:  Review of Systems   Constitutional: Negative.    HENT: Negative.     Eyes: Negative.    Respiratory: Negative.     Cardiovascular: Negative.    Gastrointestinal: Negative.    Endocrine: Negative.    Genitourinary:  Positive for frequency and urgency.   Musculoskeletal: Negative.    Skin: Negative.    Allergic/Immunologic: Negative.    Neurological: Negative.    Hematological: Negative.    Psychiatric/Behavioral: Negative.         Physical Exam:  Physical Exam  Constitutional:       Appearance: He is normal weight.   HENT:      Head: Normocephalic.      Nose: Nose normal.      Mouth/Throat:      Mouth: Mucous membranes are moist.      Pharynx: Oropharynx is clear.   Eyes:      Extraocular Movements: Extraocular movements intact.      Conjunctiva/sclera: Conjunctivae normal.      Pupils: Pupils are equal, round, and reactive to light.   Cardiovascular:      Rate and Rhythm: Normal rate and regular rhythm.      Pulses: Normal pulses.      Heart sounds: Normal heart sounds.   Pulmonary:      Effort: Pulmonary effort is normal.      Breath sounds: Normal breath sounds.   Abdominal:      General: Abdomen is flat. Bowel sounds are normal.      Palpations: Abdomen is soft.      Hernia: There is no hernia in the right inguinal area or left inguinal area.   Genitourinary:     Penis: Normal. No phimosis, paraphimosis, hypospadias, erythema, tenderness or discharge.       Testes: Normal.         Right: Mass, tenderness or swelling not present. Right testis is descended. Cremasteric reflex is present.          Left: Mass, tenderness or swelling not present. Left testis is descended. Cremasteric  reflex is present.       Prostate: Normal.      Rectum: Normal.   Musculoskeletal:         General: Normal range of motion.      Cervical back: Normal range of motion and neck supple.   Lymphadenopathy:      Lower Body: No right inguinal adenopathy. No left inguinal adenopathy.   Skin:     General: Skin is warm and dry.      Capillary Refill: Capillary refill takes less than 2 seconds.   Neurological:      General: No focal deficit present.      Mental Status: He is alert and oriented to person, place, and time. Mental status is at baseline.   Psychiatric:         Mood and Affect: Mood normal.         Behavior: Behavior normal.         Thought Content: Thought content normal.         Judgment: Judgment normal.         Assessment/Plan:     BPH:   mL.  Patient currently prescribed finasteride Flomax complaining of urinary frequency and weak urinary stream.  He does have a history of urinary retention.  Patient started on Bactrim at today's visit.  We will complete urine culture and notify him of results when they are received.  Patient also set up for cystoscopy for further evaluation of the prostate.  Patient like to discuss circumcision at the time of cystoscopy.  He is diagnosed with dementia and requires his wife at the bedside.   Problem List Items Addressed This Visit    None  Visit Diagnoses       Abnormal urinalysis    -  Primary    Relevant Orders    Urine culture    Phimosis        BPH with obstruction/lower urinary tract symptoms        Relevant Orders    Cystoscopy    Dysuria        Relevant Orders    Urine culture    POCT Urinalysis(Instrument) (Completed)

## 2024-03-29 LAB — URINE CULTURE, ROUTINE: NORMAL

## 2024-04-18 ENCOUNTER — PROCEDURE VISIT (OUTPATIENT)
Dept: UROLOGY | Facility: CLINIC | Age: 79
End: 2024-04-18
Payer: MEDICARE

## 2024-04-18 VITALS
SYSTOLIC BLOOD PRESSURE: 156 MMHG | BODY MASS INDEX: 26.48 KG/M2 | HEART RATE: 70 BPM | WEIGHT: 200.69 LBS | RESPIRATION RATE: 16 BRPM | OXYGEN SATURATION: 95 % | DIASTOLIC BLOOD PRESSURE: 81 MMHG

## 2024-04-18 DIAGNOSIS — N13.8 BPH WITH OBSTRUCTION/LOWER URINARY TRACT SYMPTOMS: ICD-10-CM

## 2024-04-18 DIAGNOSIS — N40.1 BPH WITH OBSTRUCTION/LOWER URINARY TRACT SYMPTOMS: ICD-10-CM

## 2024-04-18 PROCEDURE — 52000 CYSTOURETHROSCOPY: CPT | Mod: GV,S$GLB,, | Performed by: UROLOGY

## 2024-04-18 NOTE — PATIENT INSTRUCTIONS
Patient Education       Cystoscopy Discharge Instructions   About this topic   Your kidneys make urine. It is stored in your bladder. The urethra is a tube at the bottom of the bladder. Urine flows out of this tube. Sometimes, there is a blockage and urine is not able to leave the body.  A cystoscopy is a procedure that lets the doctor see the inside of your bladder and urethra. The doctor does it to:  Look for stones or tumors blocking the bladder and urethra  Look for changes or injury inside the bladder  Take a tissue sample from the inside of your bladder  Look for reasons for blood in the urine, pain with urination, or why you are passing urine often  Look for prostate problems     What care is needed at home?   Ask your doctor what you need to do when you go home. Make sure you ask questions if you do not understand what the doctor says. This way you will know what you need to do.  Take a warm bath or use a warm wet washcloth over the opening to the urethra. This will help to ease any pain. Do this as needed.  Drink 6 to 8 glasses of water a day and 3 to 4 glasses in the first few hours after the procedure to flush out your bladder and reduce irritation.  You may see some blood in your urine for a few days. This is normal.  Empty your bladder as soon as you feel the need to. Don't delay going to the bathroom. It stretches and weakens the bladder.  What follow-up care is needed?   Your doctor may ask you to make visits to the office to check on your progress. Be sure to keep these visits.  If you had a biopsy, talk with your doctor about the results.  What drugs may be needed?   The doctor may order drugs to:  Help with pain  Fight an infection  Help with bladder spasms  Will physical activity be limited?   Talk to your doctor about when you may go back to your normal activities like work, driving, or sex.  What problems could happen?   Bleeding  Infection  Injury to the bladder and urethra  Discomfort in the  urethra area  Burning sensation for a short time  Upset stomach  When do I need to call the doctor?   Signs of infection. These include a fever of 100.4°F (38°C) or higher, chills, pain with passing urine.  Pain that does not go away even with drugs or that lasts longer than 2 days  Too much blood in your urine  Passing large dime-sized clots  Cloudy urine  Little or no urine or not able to pass urine  Abdominal pain and nausea  Teach Back: Helping You Understand   The Teach Back Method helps you understand the information we are giving you. After you talk with the staff, tell them in your own words what you learned. This helps to make sure the staff has described each thing clearly. It also helps to explain things that may have been confusing. Before going home, make sure you can do these:  I can tell you about my procedure.  I can tell you what may help ease my pain.  I can tell you what I will do if I have a fever, chills, or am not able to pass urine.  Where can I learn more?   [employer]  https://www.cancer.org/treatment/understanding-your-diagnosis/tests/endoscopy/cystoscopy.html   [employer]  https://www.cancerresearchuk.org/about-cancer/bladder-cancer/getting-diagnosed/tests-diagnose/cystoscopy   [facility] Choices  http://www.nhs.uk/conditions/Cystoscopy/Pages/Introduction.aspx   Last Reviewed Date   2021-04-22  Consumer Information Use and Disclaimer   This information is not specific medical advice and does not replace information you receive from your health care provider. This is only a brief summary of general information. It does NOT include all information about conditions, illnesses, injuries, tests, procedures, treatments, therapies, discharge instructions or life-style choices that may apply to you. You must talk with your health care provider for complete information about your health and treatment options. This information should not be used to decide whether or not to accept your  health care providers advice, instructions or recommendations. Only your health care provider has the knowledge and training to provide advice that is right for you.  Copyright   Copyright © 2021 [employer] and its affiliates and/or licensors. All rights reserved.

## 2024-04-18 NOTE — PROCEDURES
Cystoscopy    Date/Time: 4/18/2024 1:20 PM    Performed by: Topher Pimentel MD  Authorized by: Melly Hoyt NP    Timeout: prior to procedure the correct patient, procedure, and site was verified    Prep: patient was prepped and draped in usual sterile fashion    Anesthesia:  Intraurethral instillation  Indications: hematuria    Position:  Supine  Anesthesia:  Intraurethral instillation  Patient sedated?: No    Preparation: Patient was prepped and draped in usual sterile fashion    Scope type:  Flexible cystoscope  External exam normal: Yes    Urethra normal: Yes    Prostate normal: Yes    Comments:      Patient was brought to the procedure room placed on the table padded prepped and draped in usual sterile fashion in supine position. The cystoscope was inserted into the urethra and advanced the urethra was normal prostate showed expected enlargement for patient's age, the bladder is entered and inspected is found to be free of tumor stone or foreign body.  Bilateral ureteral orifices were identified and noted to be normal in appearance with clear efflux of urine at this point the scope was removed the patient tolerated the procedure well there were no complications